# Patient Record
Sex: FEMALE | Race: WHITE | NOT HISPANIC OR LATINO | Employment: FULL TIME | URBAN - METROPOLITAN AREA
[De-identification: names, ages, dates, MRNs, and addresses within clinical notes are randomized per-mention and may not be internally consistent; named-entity substitution may affect disease eponyms.]

---

## 2018-05-06 ENCOUNTER — OFFICE VISIT (OUTPATIENT)
Dept: URGENT CARE | Facility: CLINIC | Age: 43
End: 2018-05-06
Payer: COMMERCIAL

## 2018-05-06 VITALS
DIASTOLIC BLOOD PRESSURE: 67 MMHG | RESPIRATION RATE: 16 BRPM | OXYGEN SATURATION: 100 % | TEMPERATURE: 96.2 F | HEART RATE: 84 BPM | SYSTOLIC BLOOD PRESSURE: 103 MMHG

## 2018-05-06 DIAGNOSIS — J02.9 ACUTE PHARYNGITIS, UNSPECIFIED ETIOLOGY: Primary | ICD-10-CM

## 2018-05-06 PROCEDURE — 99203 OFFICE O/P NEW LOW 30 MIN: CPT | Performed by: NURSE PRACTITIONER

## 2018-05-06 RX ORDER — AMOXICILLIN 500 MG/1
500 CAPSULE ORAL EVERY 8 HOURS SCHEDULED
Qty: 30 CAPSULE | Refills: 0 | Status: SHIPPED | OUTPATIENT
Start: 2018-05-06 | End: 2018-05-16

## 2018-05-06 NOTE — PROGRESS NOTES
[facility]        NAME: Donna Hyde is a 36 y o  female  : 1975    MRN: [id number]  DATE: May 6, 2018  TIME: 8:46 AM    Assessment and Plan   Acute pharyngitis, unspecified etiology [J02 9]  1  Acute pharyngitis, unspecified etiology  amoxicillin (AMOXIL) 500 mg capsule         Patient Instructions       Follow up with PCP in 3-5 days  Proceed to  ER if symptoms worsen  Chief Complaint     Chief Complaint   Patient presents with    Fever     x 4 days    Cold Like Symptoms    Cough    Sore Throat         History of Present Illness       Fever   Episode onset: 4 dasy  The problem has been unchanged  Associated symptoms include congestion, coughing, a fever, headaches, a sore throat and swollen glands  She has tried NSAIDs and acetaminophen for the symptoms  Cough   Associated symptoms include a fever, headaches and a sore throat  Sore Throat    Associated symptoms include congestion, coughing, headaches and swollen glands  Review of Systems   Review of Systems   Constitutional: Positive for fever  HENT: Positive for congestion and sore throat  Eyes: Negative  Respiratory: Positive for cough  Cardiovascular: Negative  Gastrointestinal: Negative  Endocrine: Negative  Genitourinary: Negative  Musculoskeletal: Negative  Allergic/Immunologic: Negative  Neurological: Positive for headaches  Psychiatric/Behavioral: Negative            Current Medications       Current Outpatient Prescriptions:     amoxicillin (AMOXIL) 500 mg capsule, Take 1 capsule (500 mg total) by mouth every 8 (eight) hours for 10 days, Disp: 30 capsule, Rfl: 0    Current Allergies     Allergies as of 2018    (No Known Allergies)            The following portions of the patient's history were reviewed and updated as appropriate: allergies, current medications, past family history, past medical history, past social history, past surgical history and problem list      Past Medical History:   Diagnosis Date    Allergic        History reviewed  No pertinent surgical history  History reviewed  No pertinent family history  Medications have been verified  Objective   /67   Pulse 84   Temp (!) 96 2 °F (35 7 °C)   Resp 16   SpO2 100%        Physical Exam     Physical Exam   Constitutional: She is oriented to person, place, and time  She appears well-developed and well-nourished  HENT:   Head: Normocephalic and atraumatic  Right Ear: External ear normal    Left Ear: External ear normal    Mouth/Throat: Oropharyngeal exudate, posterior oropharyngeal edema and posterior oropharyngeal erythema present  Eyes: Conjunctivae and EOM are normal  Pupils are equal, round, and reactive to light  Neck: Normal range of motion  Neck supple  No thyromegaly present  Cardiovascular: Normal rate, regular rhythm and normal heart sounds  Pulmonary/Chest: Effort normal and breath sounds normal  No respiratory distress  She has no wheezes  She has no rales  Abdominal: Soft  Bowel sounds are normal    Lymphadenopathy:     She has cervical adenopathy  Neurological: She is alert and oriented to person, place, and time  She has normal reflexes  Skin: Skin is warm and dry  Psychiatric: She has a normal mood and affect  Nursing note and vitals reviewed  She was advised to use any of the following for symptomatic control of sore throat:  Saltwater gargles, tea with honey, lozenges, Chloraseptic spray

## 2018-05-06 NOTE — PATIENT INSTRUCTIONS

## 2018-05-07 LAB
INFLUENZA A (VIRAL ID) (HISTORICAL): NEGATIVE
INFLUENZA B (VIRAL ID) (HISTORICAL): POSITIVE
PREGNANCY TEST URINE (HISTORICAL): NEGATIVE
SP GR UR STRIP.AUTO: 1.01 (ref 1–1.03)

## 2019-03-19 ENCOUNTER — TRANSCRIBE ORDERS (OUTPATIENT)
Dept: ADMINISTRATIVE | Facility: HOSPITAL | Age: 44
End: 2019-03-19

## 2019-03-19 DIAGNOSIS — R80.9 PROTEINURIA, UNSPECIFIED TYPE: Primary | ICD-10-CM

## 2019-03-23 ENCOUNTER — HOSPITAL ENCOUNTER (OUTPATIENT)
Dept: ULTRASOUND IMAGING | Facility: HOSPITAL | Age: 44
Discharge: HOME | End: 2019-03-23
Attending: INTERNAL MEDICINE
Payer: COMMERCIAL

## 2019-03-23 DIAGNOSIS — R80.9 PROTEINURIA, UNSPECIFIED TYPE: ICD-10-CM

## 2019-03-23 PROCEDURE — 76770 US EXAM ABDO BACK WALL COMP: CPT

## 2019-08-02 ENCOUNTER — OFFICE VISIT (OUTPATIENT)
Dept: FAMILY MEDICINE CLINIC | Facility: CLINIC | Age: 44
End: 2019-08-02
Payer: COMMERCIAL

## 2019-08-02 VITALS
HEIGHT: 63 IN | SYSTOLIC BLOOD PRESSURE: 108 MMHG | OXYGEN SATURATION: 98 % | TEMPERATURE: 100.6 F | HEART RATE: 89 BPM | DIASTOLIC BLOOD PRESSURE: 68 MMHG | BODY MASS INDEX: 25.64 KG/M2 | WEIGHT: 144.7 LBS

## 2019-08-02 DIAGNOSIS — R10.9 ABDOMINAL PAIN, UNSPECIFIED ABDOMINAL LOCATION: Primary | ICD-10-CM

## 2019-08-02 DIAGNOSIS — R10.2 PELVIC PAIN: ICD-10-CM

## 2019-08-02 PROCEDURE — 99203 OFFICE O/P NEW LOW 30 MIN: CPT | Performed by: FAMILY MEDICINE

## 2019-08-02 PROCEDURE — 3008F BODY MASS INDEX DOCD: CPT | Performed by: FAMILY MEDICINE

## 2019-08-02 PROCEDURE — 1036F TOBACCO NON-USER: CPT | Performed by: FAMILY MEDICINE

## 2019-08-02 RX ORDER — IBUPROFEN 200 MG
1 TABLET ORAL EVERY 6 HOURS PRN
COMMUNITY
End: 2020-10-06 | Stop reason: ALTCHOICE

## 2019-08-02 NOTE — PATIENT INSTRUCTIONS
Pelvic Pain in Women   AMBULATORY CARE:   Pelvic pain  may happen on one or both sides of your pelvis  Pelvic pain may occur with certain body positions or activities, such as when you have sex or a bowel movement  It may worsen during your monthly period or after you sit or stand for a long time  Chronic pelvic pain is pain that continues for longer than 6 months  Call 911 for any of the following:   · You have severe chest pain and sudden trouble breathing  Seek care immediately if:   · You have heavy or unusual vaginal bleeding, and you feel lightheaded or faint  Contact your healthcare provider if:   · You have pelvic pain that does not go away after you take pain medicine  · You develop new symptoms or your symptoms are worse than before  · You have questions or concerns about your condition or care  Medicines: You may need any of the following:  · Pain medicine  may be given in pills or creams to relieve your pain  · Hormones  may be given if your pain gets worse with your menstrual cycle  · Antibiotics  may be given if your pain is caused by infection  · Take your medicine as directed  Contact your healthcare provider if you think your medicine is not helping or if you have side effects  Tell him or her if you are allergic to any medicine  Keep a list of the medicines, vitamins, and herbs you take  Include the amounts, and when and why you take them  Bring the list or the pill bottles to follow-up visits  Carry your medicine list with you in case of an emergency  Self-care:   · Keep a pain diary  Write down when your pain happens, how severe it is, and any other symptoms you have with your pain  A diary will help you keep track of pain cycles  It may also help your healthcare provider find out what is causing your pain  · Learn ways to relax  Deep breathing, meditation, and relaxation techniques can help decrease your pain   When you are tense, your pain may increase  · Change the foods you eat if you have irritable bowel syndrome  Ask your healthcare provider about the best foods for you  Follow up with your healthcare provider as directed:  Write down your questions so you remember to ask them during your visits  © 2017 [employer] Information is for End User's use only and may not be sold, redistributed or otherwise used for commercial purposes  All illustrations and images included in CareNotes® are the copyrighted property of [employer]  or [employer]  The above information is an  only  It is not intended as medical advice for individual conditions or treatments  Talk to your doctor, nurse or pharmacist before following any medical regimen to see if it is safe and effective for you

## 2019-08-02 NOTE — PROGRESS NOTES
Assessment/Plan:    Pelvic pain  Patient has chronic lower abdominal and pelvic pain  I explained to the patient that the #1 Cause of this is endometriosis  #2  Cause is adhesions  Endometriosis was not noted by her surgeon  Symptoms began prior to her surgery  There is no reason she should have adhesions  I think gastrointestinal etiology needs to be ruled out  First, I have recommended screening for celiac disease  I explained to the patient that a simple blood test can rule this out  If celiac disease is ruled out, I would consider evaluation by Gastroenterology to rule out bacterial overgrowth, also consider colonoscopy to rule out a colon polyp  If that workup is unremarkable, I would also consider a urine for porphyrins  Patient tells me she is having some financial difficulty at this point, with her hospital bills  However, she will consent to evaluation with celiac screening at this time  I should have these results next week and I will call her with the results  Diagnoses and all orders for this visit:    Abdominal pain, unspecified abdominal location  -     Endomysial antibody, IgA; Future  -     Tissue transglutaminase, IgA; Future    Pelvic pain    Other orders  -     ibuprofen (MOTRIN) 200 mg tablet; Take 1 tablet by mouth every 6 (six) hours as needed          Subjective:      Patient ID: Karl Aguero is a 39 y o  female  This patient is a 79-year-old white female who presents to the office today to become established at this practice  She tells me that she was never able to get an appointment her current family doctor's office so she wanted to establish a relationship with a different doctor  Patient states that she has chronic pelvic pain  It began when she had an IUD inserted  She tells me it was the 3rd IUD she has had  She had pain immediately  She feels it was not placed properly  She demanded that the IUD be removed  It was but her symptoms persisted    She reports that there is a rare type of endometrial cancer in her family  As such, her gynecologist performed a hysterectomy  This was done in April  Patient was out of work for 6 weeks  She continues to complain of pelvic pain  She reports she was evaluated but nothing was found  The following portions of the patient's history were reviewed and updated as appropriate: allergies, current medications, past family history, past medical history, past social history, past surgical history and problem list     Review of Systems   Cardiovascular: Negative for chest pain, palpitations and leg swelling  Gastrointestinal: Positive for abdominal distention, abdominal pain and constipation  Negative for anal bleeding, blood in stool, diarrhea, nausea, rectal pain and vomiting  Genitourinary: Positive for pelvic pain  Negative for difficulty urinating, dysuria, frequency, hematuria and urgency  Objective:      /68 (BP Location: Left arm, Patient Position: Sitting, Cuff Size: Adult)   Pulse 89   Temp (!) 100 6 °F (38 1 °C) (Tympanic)   Ht 5' 3" (1 6 m)   Wt 65 6 kg (144 lb 11 2 oz)   SpO2 98%   BMI 25 63 kg/m²          Physical Exam   Constitutional: She appears well-developed and well-nourished  No distress  HENT:   Head: Normocephalic and atraumatic  Right Ear: External ear normal    Left Ear: External ear normal    Mouth/Throat: Oropharynx is clear and moist  No oropharyngeal exudate  Tympanic membranes are clear   Eyes: Pupils are equal, round, and reactive to light  Conjunctivae are normal  No scleral icterus  Neck: Neck supple  No tracheal deviation present  No thyromegaly present  Cardiovascular: Normal rate, regular rhythm and normal heart sounds  Exam reveals no gallop and no friction rub  No murmur heard  Pulmonary/Chest: Effort normal and breath sounds normal  No stridor  No respiratory distress  She has no wheezes  She has no rales  Abdominal: Soft   Bowel sounds are normal  She exhibits no distension and no mass  There is no tenderness  There is no rebound and no guarding  No organomegaly is noted  No tympany to percussion  Lymphadenopathy:     She has no cervical adenopathy  Psychiatric: She has a normal mood and affect  Her behavior is normal  Judgment and thought content normal    Vitals reviewed  extremities:  Without cyanosis, clubbing, or edema

## 2019-08-02 NOTE — ASSESSMENT & PLAN NOTE
Patient has chronic lower abdominal and pelvic pain  I explained to the patient that the #1 Cause of this is endometriosis  #2  Cause is adhesions  Endometriosis was not noted by her surgeon  Symptoms began prior to her surgery  There is no reason she should have adhesions  I think gastrointestinal etiology needs to be ruled out  First, I have recommended screening for celiac disease  I explained to the patient that a simple blood test can rule this out  If celiac disease is ruled out, I would consider evaluation by Gastroenterology to rule out bacterial overgrowth, also consider colonoscopy to rule out a colon polyp  If that workup is unremarkable, I would also consider a urine for porphyrins  Patient tells me she is having some financial difficulty at this point, with her hospital bills  However, she will consent to evaluation with celiac screening at this time  I should have these results next week and I will call her with the results

## 2020-10-06 ENCOUNTER — OFFICE VISIT (OUTPATIENT)
Dept: FAMILY MEDICINE CLINIC | Facility: CLINIC | Age: 45
End: 2020-10-06
Payer: COMMERCIAL

## 2020-10-06 VITALS
HEART RATE: 66 BPM | BODY MASS INDEX: 25.33 KG/M2 | TEMPERATURE: 98.5 F | DIASTOLIC BLOOD PRESSURE: 70 MMHG | SYSTOLIC BLOOD PRESSURE: 102 MMHG | OXYGEN SATURATION: 100 % | WEIGHT: 148.4 LBS | HEIGHT: 64 IN

## 2020-10-06 DIAGNOSIS — Z00.00 ANNUAL PHYSICAL EXAM: Primary | ICD-10-CM

## 2020-10-06 DIAGNOSIS — N60.02 CYST OF LEFT BREAST: ICD-10-CM

## 2020-10-06 DIAGNOSIS — Z13.220 ENCOUNTER FOR LIPID SCREENING FOR CARDIOVASCULAR DISEASE: ICD-10-CM

## 2020-10-06 DIAGNOSIS — Z12.31 ENCOUNTER FOR SCREENING MAMMOGRAM FOR MALIGNANT NEOPLASM OF BREAST: ICD-10-CM

## 2020-10-06 DIAGNOSIS — Z13.6 ENCOUNTER FOR LIPID SCREENING FOR CARDIOVASCULAR DISEASE: ICD-10-CM

## 2020-10-06 PROBLEM — Z80.3 FAMILY HISTORY OF BREAST CANCER: Status: ACTIVE | Noted: 2020-10-06

## 2020-10-06 PROBLEM — Z80.49 FAMILY HISTORY OF UTERINE CANCER: Status: ACTIVE | Noted: 2020-10-06

## 2020-10-06 PROBLEM — J30.2 SEASONAL ALLERGIES: Status: ACTIVE | Noted: 2020-10-06

## 2020-10-06 PROCEDURE — 3725F SCREEN DEPRESSION PERFORMED: CPT | Performed by: FAMILY MEDICINE

## 2020-10-06 PROCEDURE — 1036F TOBACCO NON-USER: CPT | Performed by: FAMILY MEDICINE

## 2020-10-06 PROCEDURE — 99386 PREV VISIT NEW AGE 40-64: CPT | Performed by: FAMILY MEDICINE

## 2021-01-25 ENCOUNTER — TELEPHONE (OUTPATIENT)
Dept: FAMILY MEDICINE CLINIC | Facility: CLINIC | Age: 46
End: 2021-01-25

## 2021-01-25 NOTE — TELEPHONE ENCOUNTER
Please ask her to clarify- is this the letter from her work physical visit in October? And has she had any recent illnesses or changes in her health since last visit- thanks!

## 2021-01-25 NOTE — TELEPHONE ENCOUNTER
Pt called and stated that her letter to go back to work needs to be adjusted    It has to say that she is free of any communicable diseases and have no work restrictions  On the letter      Please advise    Phone: [phone]

## 2021-01-28 NOTE — TELEPHONE ENCOUNTER
Patient called back  She states that the note is for her to provide potential employers as she is an LPN looking for a new job  It just needs to state she has no communicable diseases and that she is able to work  She states there were no changes in her health or any illnesses since her last visit with you in October

## 2021-03-25 ENCOUNTER — APPOINTMENT (EMERGENCY)
Dept: RADIOLOGY | Facility: HOSPITAL | Age: 46
End: 2021-03-25
Payer: COMMERCIAL

## 2021-03-25 ENCOUNTER — HOSPITAL ENCOUNTER (EMERGENCY)
Facility: HOSPITAL | Age: 46
Discharge: HOME | End: 2021-03-25
Attending: EMERGENCY MEDICINE | Admitting: EMERGENCY MEDICINE
Payer: COMMERCIAL

## 2021-03-25 VITALS
OXYGEN SATURATION: 100 % | SYSTOLIC BLOOD PRESSURE: 129 MMHG | BODY MASS INDEX: 26.13 KG/M2 | RESPIRATION RATE: 16 BRPM | WEIGHT: 142 LBS | HEART RATE: 77 BPM | HEIGHT: 62 IN | DIASTOLIC BLOOD PRESSURE: 82 MMHG | TEMPERATURE: 97 F

## 2021-03-25 DIAGNOSIS — M25.522 LEFT ELBOW PAIN: Primary | ICD-10-CM

## 2021-03-25 PROCEDURE — 99283 EMERGENCY DEPT VISIT LOW MDM: CPT

## 2021-03-25 PROCEDURE — 99282 EMERGENCY DEPT VISIT SF MDM: CPT | Performed by: EMERGENCY MEDICINE

## 2021-03-25 PROCEDURE — 73080 X-RAY EXAM OF ELBOW: CPT

## 2021-03-25 NOTE — DISCHARGE INSTRUCTIONS
RETURN IF WORSE IN ANY WAY, OR NEW AND CONCERNING SYMPTOMS SIGNS OR SYMPTOMS:  INCREASED PAIN, INCREASED SWELLING AT THE SITE OF INJURY    APPLY ICE AS NEEDED  YOU CAN TAKE TYLENOL AND MOTRIN, AS NEEDED, AND AS DIRECTED     PLEASE CALL YOUR PRIMARY DOCTOR IN THE MORNING TO SET UP FOLLOW UP

## 2021-03-25 NOTE — ED PROVIDER NOTES
History  Chief Complaint   Patient presents with    Elbow Injury     LEFT elbow, ongoing, worse the past week, pain worse with movememnt or carrying things, neuro intact       53 yo Female    PMH:    None    Chief Complaint:   Left elbow pain has been going on for several weeks      Mode of Transport:   Private car      Mechanism:  Pt was carrying heavy wash baskets, approx 50 lbs  Has been hurting since      No other injuries    No fever/chills  No n/v  No headache/dizziness    No redness of the elbow, no swelling    Interventions:  Motrin, did not help much       No other complaints            History provided by:  Patient  Elbow Pain  Location:  Elbow  Elbow location:  L elbow  Pain details:     Quality:  Aching    Onset quality:  Gradual    Timing:  Constant  Handedness:  Right-handed  Dislocation: no    Foreign body present:  No foreign bodies  Relieved by:  Rest  Worsened by:  Bearing weight, movement and stress  Associated symptoms: no back pain, no decreased range of motion, no fatigue, no fever, no muscle weakness, no neck pain, no numbness, no stiffness, no swelling and no tingling        None       Past Medical History:   Diagnosis Date    Allergic     seasonal       Past Surgical History:   Procedure Laterality Date     SECTION      HYSTERECTOMY         Family History   Problem Relation Age of Onset    Uterine cancer Mother     Leukemia Father     Fibroids Sister     No Known Problems Daughter     No Known Problems Sister     Uterine cancer Maternal Aunt      I have reviewed and agree with the history as documented      E-Cigarette/Vaping     E-Cigarette/Vaping Substances     Social History     Tobacco Use    Smoking status: Former Smoker     Quit date:      Years since quittin 2    Smokeless tobacco: Never Used   Substance Use Topics    Alcohol use: Yes     Frequency: Monthly or less     Drinks per session: 1 or 2     Binge frequency: Never    Drug use: Never       Review of Systems   Constitutional: Negative for chills, diaphoresis, fatigue and fever  Respiratory: Negative for cough, shortness of breath, wheezing and stridor  Cardiovascular: Negative for chest pain, palpitations and leg swelling  Gastrointestinal: Negative for abdominal pain, blood in stool, nausea and vomiting  Genitourinary: Negative for difficulty urinating, dysuria, flank pain and frequency  Musculoskeletal: Negative for arthralgias, back pain, gait problem, joint swelling, myalgias, neck pain, neck stiffness and stiffness  Left posterior elbow pain    Skin: Negative for rash and wound  Neurological: Negative for dizziness, light-headedness and headaches  All other systems reviewed and are negative  Physical Exam  Physical Exam  Constitutional:       General: She is not in acute distress  Appearance: She is well-developed  She is not ill-appearing, toxic-appearing or diaphoretic  HENT:      Head: Normocephalic and atraumatic  Nose: Nose normal       Mouth/Throat:      Pharynx: No oropharyngeal exudate  Eyes:      General: No scleral icterus  Right eye: No discharge  Left eye: No discharge  Conjunctiva/sclera: Conjunctivae normal       Pupils: Pupils are equal, round, and reactive to light  Neck:      Musculoskeletal: Normal range of motion and neck supple  No neck rigidity or muscular tenderness  Vascular: No JVD  Trachea: No tracheal deviation  Cardiovascular:      Rate and Rhythm: Normal rate and regular rhythm  Heart sounds: Normal heart sounds  No murmur  No friction rub  No gallop  Pulmonary:      Effort: Pulmonary effort is normal  No respiratory distress  Breath sounds: Normal breath sounds  No stridor  No wheezing, rhonchi or rales  Chest:      Chest wall: No tenderness  Abdominal:      General: Bowel sounds are normal  There is no distension  Palpations: Abdomen is soft  There is no mass        Tenderness: There is no abdominal tenderness  There is no right CVA tenderness, left CVA tenderness, guarding or rebound  Hernia: No hernia is present  Musculoskeletal: Normal range of motion  General: Tenderness (VERY MILD TENDERNESS OVER THE PROXIMAL OLECRANON WITHOUT CREPITUS OR ECCHYMOSIS  NO STEP-OFFS  NO SWELLING  NO REDNESS) present  No swelling, deformity or signs of injury  Right lower leg: No edema  Left lower leg: No edema  Lymphadenopathy:      Cervical: No cervical adenopathy  Skin:     General: Skin is warm  Capillary Refill: Capillary refill takes less than 2 seconds  Coloration: Skin is not jaundiced or pale  Findings: No bruising, erythema, lesion or rash  Neurological:      General: No focal deficit present  Mental Status: She is alert and oriented to person, place, and time  Mental status is at baseline  Cranial Nerves: No cranial nerve deficit  Sensory: No sensory deficit  Motor: No weakness or abnormal muscle tone  Coordination: Coordination normal       Gait: Gait normal    Psychiatric:         Mood and Affect: Mood normal          Behavior: Behavior normal          Thought Content:  Thought content normal          Judgment: Judgment normal          Vital Signs  ED Triage Vitals [03/25/21 0133]   Temperature Pulse Respirations Blood Pressure SpO2   (!) 97 °F (36 1 °C) 77 16 129/82 100 %      Temp Source Heart Rate Source Patient Position - Orthostatic VS BP Location FiO2 (%)   Tympanic -- Sitting Right arm --      Pain Score       4           Vitals:    03/25/21 0133   BP: 129/82   Pulse: 77   Patient Position - Orthostatic VS: Sitting         Visual Acuity      ED Medications  Medications - No data to display    Diagnostic Studies  Results Reviewed     None                 XR elbow 3+ vw LEFT   ED Interpretation by Xuan Fraga MD (03/25 0221)     INDICATION: Julianne Dopp           VIEWS:  XR elbow 3+ VW Left       FINDINGS:     There is no acute fracture or dislocation      No significant degenerative changes      No lytic or blastic osseous lesion      Soft tissues are unremarkable      IMPRESSION:     No acute osseous abnormality  Procedures  Procedures         ED Course  ED Course as of Mar 25 0344   Thu Mar 25, 2021   0221 Xray unremarkable  Will dc w/ Ortho f/u       0227 PATIENT IS HAPPY WITH PLAN TO DISCHARGE  SHE DOES NOT WANT SEE DR Chelsy Escamilla  MDM    Disposition  Final diagnoses:   Left elbow pain     Time reflects when diagnosis was documented in both MDM as applicable and the Disposition within this note     Time User Action Codes Description Comment    3/25/2021  2:23 AM Terry Hdez Add [L51 206] Left elbow pain       ED Disposition     ED Disposition Condition Date/Time Comment    Discharge Stable u Mar 25, 2021  2:23 AM Halley Candelario discharge to home/self care  Follow-up Information     Follow up With Specialties Details Why Contact Info    Ricarda Narvaez MD Orthopedic Surgery Call today  Penny Ly [facility] Orthopedic Surgery Call today  [address] [place] [address]  [phone]            There are no discharge medications for this patient          PDMP Review     None          ED Provider  Electronically Signed by           Elizabeth Kay MD  03/25/21 8902

## 2021-03-31 ENCOUNTER — OFFICE VISIT (OUTPATIENT)
Dept: FAMILY MEDICINE CLINIC | Facility: CLINIC | Age: 46
End: 2021-03-31
Payer: COMMERCIAL

## 2021-03-31 VITALS
BODY MASS INDEX: 27.6 KG/M2 | HEART RATE: 68 BPM | HEIGHT: 62 IN | WEIGHT: 150 LBS | OXYGEN SATURATION: 98 % | RESPIRATION RATE: 14 BRPM | TEMPERATURE: 98.1 F | DIASTOLIC BLOOD PRESSURE: 60 MMHG | SYSTOLIC BLOOD PRESSURE: 110 MMHG

## 2021-03-31 DIAGNOSIS — M47.22 OSTEOARTHRITIS OF SPINE WITH RADICULOPATHY, CERVICAL REGION: Primary | ICD-10-CM

## 2021-03-31 DIAGNOSIS — E55.9 VITAMIN D DEFICIENCY: ICD-10-CM

## 2021-03-31 DIAGNOSIS — G89.29 CHRONIC LEFT SHOULDER PAIN: ICD-10-CM

## 2021-03-31 DIAGNOSIS — M25.512 CHRONIC LEFT SHOULDER PAIN: ICD-10-CM

## 2021-03-31 PROCEDURE — 3725F SCREEN DEPRESSION PERFORMED: CPT | Performed by: INTERNAL MEDICINE

## 2021-03-31 PROCEDURE — 99214 OFFICE O/P EST MOD 30 MIN: CPT | Performed by: INTERNAL MEDICINE

## 2021-03-31 PROCEDURE — 3008F BODY MASS INDEX DOCD: CPT | Performed by: INTERNAL MEDICINE

## 2021-03-31 PROCEDURE — 1036F TOBACCO NON-USER: CPT | Performed by: INTERNAL MEDICINE

## 2021-03-31 RX ORDER — MELOXICAM 15 MG/1
15 TABLET ORAL DAILY
COMMUNITY
Start: 2021-03-26 | End: 2021-06-30

## 2021-03-31 NOTE — PROGRESS NOTES
[address] [facility]        NAME: Gracelyn Schlatter is a 54 y o  female  : 1975    MRN: [id number]  DATE: 2021  TIME: 9:30 AM    Assessment and Plan   1  Osteoarthritis of spine with radiculopathy, cervical region  - xrays showing degenerative changes C6-7, strength and reflexes intact, we discussed PT and NSAIDs, follow up in 2-3 months   -    CBC and differential; Future  -     Comprehensive metabolic panel; Future  -     Vitamin D 25 hydroxy; Future    2  Vitamin D deficiency  -     Vitamin D 25 hydroxy; Future    3  Chronic left shoulder pain  - suspect referred pain from neck to shoulder and elbow           Chief Complaint     Chief Complaint   Patient presents with    Shoulder Pain      went to another doc, states that the issue is in her neck and back         History of Present Illness       Here for follow up left shoulder and elbow pain  Started about 2 months ago after lifting heavy wash baskets in snow storm  Located in left elbow and shoulder, worse with activity like raising arm above head and bending elbow  Went to ED and had xray of elbow which was normal  Saw [facility] last week, diagnosed with cervical radiculopathy, referred to PT  Has EMG scheduled in  and given Mobic  Denies neck pain or limited ROM but does report "stiffness" in her neck  Denies numbness, paresthesias or weakness  Remote history of low back injury while in grade school  No history of spinal surgery  Review of Systems   Review of Systems   Constitutional: Positive for fatigue  Negative for appetite change, chills and fever  Musculoskeletal: Positive for back pain and neck stiffness  Negative for arthralgias, gait problem, joint swelling and neck pain  Neurological: Negative for tremors, weakness and numbness           Current Medications       Current Outpatient Medications:     meloxicam (MOBIC) 15 mg tablet, Take 15 mg by mouth daily, Disp: , Rfl:     Current Allergies     Allergies as of 2021    (No Known Allergies)            The following portions of the patient's history were reviewed and updated as appropriate: allergies, current medications, past family history, past medical history, past social history, past surgical history and problem list      Past Medical History:   Diagnosis Date    Allergic     seasonal       Past Surgical History:   Procedure Laterality Date     SECTION      HYSTERECTOMY         Family History   Problem Relation Age of Onset    Uterine cancer Mother     Leukemia Father     Fibroids Sister     No Known Problems Daughter     No Known Problems Sister     Uterine cancer Maternal Aunt          Medications have been verified  Objective   /60   Pulse 68   Temp 98 1 °F (36 7 °C) (Tympanic)   Resp 14   Ht 5' 2" (1 575 m)   Wt 68 kg (150 lb)   SpO2 98%   BMI 27 44 kg/m²        Physical Exam     Physical Exam  Vitals signs reviewed  Constitutional:       General: She is not in acute distress  Appearance: Normal appearance  She is normal weight  Neck:      Musculoskeletal: Normal range of motion  Normal range of motion  Pain with movement (some pain with lateral bending of neck) present  No spinous process tenderness or muscular tenderness  Musculoskeletal:      Left shoulder: She exhibits normal range of motion, no bony tenderness, no swelling and no crepitus  Left elbow: She exhibits normal range of motion, no swelling and no effusion  Tenderness found  Medial epicondyle tenderness noted  No olecranon process tenderness noted  Neurological:      Mental Status: She is alert  Motor: Motor function is intact  Gait: Gait is intact  Deep Tendon Reflexes:      Reflex Scores:       Tricep reflexes are 2+ on the right side and 2+ on the left side  Bicep reflexes are 2+ on the right side and 2+ on the left side  Brachioradialis reflexes are 2+ on the right side and 2+ on the left side  Patellar reflexes are 2+ on the right side and 2+ on the left side  Psychiatric:         Mood and Affect: Mood and affect normal          Speech: Speech normal          Behavior: Behavior normal  Behavior is cooperative  Thought Content: Thought content normal              Results:  C-spine xray 03/26/21  Exam: Cervical spine x-rays     Clinical indication: Radiculopathy    Comparison: None    Technique: 3 views of the cervical spine was obtained  Findings: AP and lateral views of the cervical spine demonstrates no evidence of fracture or dislocation  The prevertebral soft tissue space is within normal limits  Anterior spur formation is seen at the level of C5-C6  Spondylosis deformans characterized by anterior and posterior spur formation, disc space narrowing and osteoarthritic changes are seen at the level of C6-C7  The lateral masses of C1 articulate normally with the odontoid process  IMPRESSION:  Impression: Spondylosis deformans and osteoarthritic changes with disc space narrowing as described above

## 2021-03-31 NOTE — PATIENT INSTRUCTIONS
Please get blood work done to check calcium and vitamin D  Schedule with physical therapy, continue Mobic

## 2021-04-02 ENCOUNTER — EVALUATION (OUTPATIENT)
Dept: PHYSICAL THERAPY | Facility: CLINIC | Age: 46
End: 2021-04-02
Payer: COMMERCIAL

## 2021-04-02 ENCOUNTER — TRANSCRIBE ORDERS (OUTPATIENT)
Dept: PHYSICAL THERAPY | Facility: CLINIC | Age: 46
End: 2021-04-02

## 2021-04-02 VITALS — SYSTOLIC BLOOD PRESSURE: 98 MMHG | HEART RATE: 69 BPM | DIASTOLIC BLOOD PRESSURE: 69 MMHG

## 2021-04-02 DIAGNOSIS — M54.2 NECK PAIN: Primary | ICD-10-CM

## 2021-04-02 PROCEDURE — 97110 THERAPEUTIC EXERCISES: CPT | Performed by: PHYSICAL THERAPIST

## 2021-04-02 PROCEDURE — 97161 PT EVAL LOW COMPLEX 20 MIN: CPT | Performed by: PHYSICAL THERAPIST

## 2021-04-02 NOTE — LETTER
2021    Dahlia López, [address]  [address] [facility] [address]    Patient: Seven Nichole   YOB: 1975   Date of Visit: 2021     Encounter Diagnosis     ICD-10-CM    1  Neck pain  M54 2        Dear Dr Dai Rodriguez: Thank you for your recent referral of Seven Nichole  Please review the attached evaluation summary from Jerilyn's recent visit  Please verify that you agree with the plan of care by signing the attached order  If you have any questions or concerns, please do not hesitate to call  I sincerely appreciate the opportunity to share in the care of one of your patients and hope to have another opportunity to work with you in the near future  Sincerely,    Blake Aldana, PT      Referring Provider:      I certify that I have read the below Plan of Care and certify the need for these services furnished under this plan of treatment while under my care  Dahlia López MD  [address] [place] [address]  [address] [place] [phone]  Via Fax: [phone]          PT Evaluation     Today's date: 2021  Patient name: Seven Nichole  : 1975  MRN: [id number]  Referring provider: Carmita Landaverde MD  Dx:   Encounter Diagnosis     ICD-10-CM    1  Neck pain  M54 2                   Assessment  Assessment details: Seven Nichole is a 54 y o  female who presents with complaints of chronic neck pain and stiffness  No further referral appears necessary at this time based upon examination results  PT diagnosis of postural syndrome as patient's symptoms are exacerbated by poor posture and improved with correction of posture  Patient has no radicular symptoms  Etiologic factors include repetitive poor body mechanics  Prognosis is good given HEP compliance and PT 2x/wk  Please contact me if you have any questions or recommendations  Thank you for the opportunity to share in  [facility] care       Impairments: abnormal or restricted ROM, activity intolerance, impaired physical strength, lacks appropriate home exercise program, pain with function and poor posture   Functional limitations: stiffness and pain during work activity and with lifting, pain and stiffness when using computer or looking at phoneUnderstanding of Dx/Px/POC: good   Prognosis: good    Goals  STGs  1) In 2-4 weeks patient will demonstrate improved postural awareness  2) In 2-4 weeks patient will report 50% reduction in symptoms  3) In 2-4 weeks patient will demonstrate improved DNF endurance, able to maintain supine cervical retraction with head lift for 10 seconds or more    LTGs  1) In 4-6 weeks patient will demonstrate independence with HEP for maintenance  2) In 4-6 weeks patient will report 75% improved symptoms or better    Plan  Patient would benefit from: skilled physical therapy  Referral necessary: No  Planned modality interventions: cryotherapy, TENS, thermotherapy: hydrocollator packs, traction and unattended electrical stimulation  Planned therapy interventions: joint mobilization, massage, Brooks taping, manual therapy, postural training, patient education, home exercise program, functional ROM exercises and flexibility  Frequency: 2x week  Duration in weeks: 6  Plan of Care beginning date: 4/2/2021  Plan of Care expiration date: 5/14/2021  Treatment plan discussed with: patient        Subjective Evaluation    History of Present Illness  Mechanism of injury: Patient presents to PT with c/o neck stiffness that has been going on for years  She reports the stiffness has just not gotten any better  She cannot think of any specific incident and denies trauma  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  She did have a recent x-ray which showed arthritis  She reports that her arms also feel a little weak when she tries to exercise  She does not notice pain that radiates into her arms  She denies N/T   Denies difficulty speaking or swallowing  Denies lightheadedness or dizziness  Denies nausea or vomiting  Denies any recent unexplained weight loss  Denies headaches  Denies difficulty sleeping or pain at night  Patient reports she feels best first thing in the morning and is fine when she doesn't have to do much  Stiffness comes on throughout her work day  She was prescribed Mobic and started taking this on Monday  She has not noticed much difference since starting this medication  Pain  At best pain ratin  At worst pain ratin  Location: back of neck, b/l  Quality: stiffness    Relieving factors: heat and rest  Aggravating factors: lifting and overhead activity    Social Support  Lives with: young children    Employment status: working  Hand dominance: right  Exercise history: no routine exercise      Diagnostic Tests  X-ray: normal  Treatments  No previous or current treatments  Patient Goals  Patient goals for therapy: decreased pain  Patient goal: improve comfort, decrease stiffness        Objective     Concurrent Complaints  Negative for night pain, disturbed sleep, dizziness, faints, headaches, nausea/motion sickness, tinnitus, trouble swallowing, difficulty breathing, shortness of breath, respiratory pain and visual change    Postural Observations  Seated posture: fair  Standing posture: fair  Correction of posture: makes symptoms better        Active Range of Motion   Cervical/Thoracic Spine       Cervical  Subcranial protraction:  WFL and with pain   Subcranial retraction:   Restriction level: moderate  Flexion: 35 degrees   Extension: 65 degrees      Left lateral flexion: 45 ("tightness" on R side) degrees     with pain  Right lateral flexion: 45 degrees      Left rotation: 75 degrees  Right rotation: 75 degrees             Joint Play     Hypomobile: T1, T2, T3, T4, T5, T6 and T7     Strength/Myotome Testing   Cervical Spine     Left   Interossei strength (t1): 5    Right   Interossei strength (t1): 5    Left Shoulder     Planes of Motion   Abduction: 5     Isolated Muscles   Upper trapezius: 5     Right Shoulder     Planes of Motion   Abduction: 5     Isolated Muscles   Upper trapezius: 5     Left Elbow   Flexion: 4+  Extension: 4+    Right Elbow   Flexion: 4+  Extension: 4+    Left Wrist/Hand   Thumb extension: 5    Right Wrist/Hand   Thumb extension: 5    Tests   Cervical   Negative vertical compression, lumbar distraction and neck flexor muscle endurance test      Left   Negative Spurling's Test A  Right   Negative Spurling's Test A  Left Shoulder   Positive ULTT4  Negative ULTT1  Right Shoulder   Negative ULTT1 and ULTT4  Lumbar   Negative vertical compression  Neuro Exam:     Headaches   Patient reports headaches: No               Precautions: none  POC exp 5/14/21  Manuals 4/2                                                                Neuro Re-Ed             Seated chin tuck with self OP 3" x10            Seated Postural correction with towel roll instructed for HEP            Scap retractions             B/l TB ER              Supine chin tuck with head lift DNF strengthening             Prone shoulder scaption             Prone horizontal abduction             Prone shoulder extension             TB Pull downs with cues for postural correction             TB rows with cues for postural correction                          Ther Ex             UT stretch 15"x4 ea            Levator scap stretch 15" x4 ea            Thoracic extension over chair                                                                              Ther Activity             UBE for UE endurance                                                                 Modalities                                             Patient was provided a home exercise program and demonstrated an understanding of exercises  Patient was advised to stop performing home exercise program if symptoms increase or new complaints developed    Verbal understanding demonstrated regarding home exercise program instructions

## 2021-04-02 NOTE — PROGRESS NOTES
PT Evaluation     Today's date: 2021  Patient name: Katharine Neil  : 1975  MRN: [id number]  Referring provider: Fawn Hernandez MD  Dx:   Encounter Diagnosis     ICD-10-CM    1  Neck pain  M54 2                   Assessment  Assessment details: Katharine Neil is a 54 y o  female who presents with complaints of chronic neck pain and stiffness  No further referral appears necessary at this time based upon examination results  PT diagnosis of postural syndrome as patient's symptoms are exacerbated by poor posture and improved with correction of posture  Patient has no radicular symptoms  Etiologic factors include repetitive poor body mechanics  Prognosis is good given HEP compliance and PT 2x/wk  Please contact me if you have any questions or recommendations  Thank you for the opportunity to share in  [facility] care       Impairments: abnormal or restricted ROM, activity intolerance, impaired physical strength, lacks appropriate home exercise program, pain with function and poor posture   Functional limitations: stiffness and pain during work activity and with lifting, pain and stiffness when using computer or looking at phoneUnderstanding of Dx/Px/POC: good   Prognosis: good    Goals  STGs  1) In 2-4 weeks patient will demonstrate improved postural awareness  2) In 2-4 weeks patient will report 50% reduction in symptoms  3) In 2-4 weeks patient will demonstrate improved DNF endurance, able to maintain supine cervical retraction with head lift for 10 seconds or more    LTGs  1) In 4-6 weeks patient will demonstrate independence with HEP for maintenance  2) In 4-6 weeks patient will report 75% improved symptoms or better    Plan  Patient would benefit from: skilled physical therapy  Referral necessary: No  Planned modality interventions: cryotherapy, TENS, thermotherapy: hydrocollator packs, traction and unattended electrical stimulation  Planned therapy interventions: joint mobilization, massage, Todd taping, manual therapy, postural training, patient education, home exercise program, functional ROM exercises and flexibility  Frequency: 2x week  Duration in weeks: 6  Plan of Care beginning date: 2021  Plan of Care expiration date: 2021  Treatment plan discussed with: patient        Subjective Evaluation    History of Present Illness  Mechanism of injury: Patient presents to PT with c/o neck stiffness that has been going on for years  She reports the stiffness has just not gotten any better  She cannot think of any specific incident and denies trauma  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  She did have a recent x-ray which showed arthritis  She reports that her arms also feel a little weak when she tries to exercise  She does not notice pain that radiates into her arms  She denies N/T  Denies difficulty speaking or swallowing  Denies lightheadedness or dizziness  Denies nausea or vomiting  Denies any recent unexplained weight loss  Denies headaches  Denies difficulty sleeping or pain at night  Patient reports she feels best first thing in the morning and is fine when she doesn't have to do much  Stiffness comes on throughout her work day  She was prescribed Mobic and started taking this on Monday  She has not noticed much difference since starting this medication  Pain  At best pain ratin  At worst pain ratin  Location: back of neck, b/l  Quality: stiffness    Relieving factors: heat and rest  Aggravating factors: lifting and overhead activity    Social Support  Lives with: young children    Employment status: working  Hand dominance: right  Exercise history: no routine exercise      Diagnostic Tests  X-ray: normal  Treatments  No previous or current treatments  Patient Goals  Patient goals for therapy: decreased pain  Patient goal: improve comfort, decrease stiffness        Objective     Concurrent Complaints  Negative for night pain, disturbed sleep, dizziness, faints, headaches, nausea/motion sickness, tinnitus, trouble swallowing, difficulty breathing, shortness of breath, respiratory pain and visual change    Postural Observations  Seated posture: fair  Standing posture: fair  Correction of posture: makes symptoms better        Active Range of Motion   Cervical/Thoracic Spine       Cervical  Subcranial protraction:  WFL and with pain   Subcranial retraction:   Restriction level: moderate  Flexion: 35 degrees   Extension: 65 degrees      Left lateral flexion: 45 ("tightness" on R side) degrees     with pain  Right lateral flexion: 45 degrees      Left rotation: 75 degrees  Right rotation: 75 degrees             Joint Play     Hypomobile: T1, T2, T3, T4, T5, T6 and T7     Strength/Myotome Testing   Cervical Spine     Left   Interossei strength (t1): 5    Right   Interossei strength (t1): 5    Left Shoulder     Planes of Motion   Abduction: 5     Isolated Muscles   Upper trapezius: 5     Right Shoulder     Planes of Motion   Abduction: 5     Isolated Muscles   Upper trapezius: 5     Left Elbow   Flexion: 4+  Extension: 4+    Right Elbow   Flexion: 4+  Extension: 4+    Left Wrist/Hand   Thumb extension: 5    Right Wrist/Hand   Thumb extension: 5    Tests   Cervical   Negative vertical compression, lumbar distraction and neck flexor muscle endurance test      Left   Negative Spurling's Test A  Right   Negative Spurling's Test A  Left Shoulder   Positive ULTT4  Negative ULTT1  Right Shoulder   Negative ULTT1 and ULTT4  Lumbar   Negative vertical compression     Neuro Exam:     Headaches   Patient reports headaches: No               Precautions: none  POC exp 5/14/21  Manuals 4/2                                                                Neuro Re-Ed             Seated chin tuck with self OP 3" x10            Seated Postural correction with towel roll instructed for HEP            Scap retractions             B/l TB ER Supine chin tuck with head lift DNF strengthening             Prone shoulder scaption             Prone horizontal abduction             Prone shoulder extension             TB Pull downs with cues for postural correction             TB rows with cues for postural correction                          Ther Ex             UT stretch 15"x4 ea            Levator scap stretch 15" x4 ea            Thoracic extension over chair                                                                              Ther Activity             UBE for UE endurance                                                                 Modalities                                             Patient was provided a home exercise program and demonstrated an understanding of exercises  Patient was advised to stop performing home exercise program if symptoms increase or new complaints developed  Verbal understanding demonstrated regarding home exercise program instructions

## 2021-04-12 ENCOUNTER — APPOINTMENT (OUTPATIENT)
Dept: PHYSICAL THERAPY | Facility: CLINIC | Age: 46
End: 2021-04-12
Payer: COMMERCIAL

## 2021-04-16 ENCOUNTER — APPOINTMENT (OUTPATIENT)
Dept: PHYSICAL THERAPY | Facility: CLINIC | Age: 46
End: 2021-04-16
Payer: COMMERCIAL

## 2021-04-19 ENCOUNTER — OFFICE VISIT (OUTPATIENT)
Dept: PHYSICAL THERAPY | Facility: CLINIC | Age: 46
End: 2021-04-19
Payer: COMMERCIAL

## 2021-04-19 DIAGNOSIS — M54.2 NECK PAIN: Primary | ICD-10-CM

## 2021-04-19 PROCEDURE — 97110 THERAPEUTIC EXERCISES: CPT | Performed by: PHYSICAL THERAPIST

## 2021-04-19 PROCEDURE — 97530 THERAPEUTIC ACTIVITIES: CPT | Performed by: PHYSICAL THERAPIST

## 2021-04-19 PROCEDURE — 97112 NEUROMUSCULAR REEDUCATION: CPT | Performed by: PHYSICAL THERAPIST

## 2021-04-19 NOTE — PROGRESS NOTES
Daily Note     Today's date: 2021  Patient name: Cristine Luna  : 1975  MRN: [id number]  Referring provider: Vasyl Astudillo MD  Dx:   Encounter Diagnosis     ICD-10-CM    1  Neck pain  M54 2                   Subjective: The patient reports that she is not having any pain or stiffness today  She notes she has been doing her exercises at home, has been unable to attend PT since her IE secondary to work schedule  Objective: See treatment diary below  Assessment: Initiated TE as outlined below in daily treatment diary  She did note some clicking in her L shoulder with TBand pull downs and prone TE though no increase in pain during session  She had no pain to end  Continued PT would be beneficial to improve function  Plan: Continue per plan of care  Progress as able in upcoming visits         Precautions: none  POC exp 21  Manuals                                                                Neuro Re-Ed             Seated chin tuck with self OP 3" x10 3" x 10           Seated Postural correction with towel roll instructed for HEP            Scap retractions             B/l TB ER   Green  2 x 10            Supine chin tuck with head lift DNF strengthening             Prone shoulder scaption  Severo  2 x 10           Prone horizontal abduction  Severo   2 x 10           Prone shoulder extension  Severo   2 x 10            TB Pull downs with cues for postural correction  Blue  3 x 10           TB rows with cues for postural correction  Blue   3 x 10                         Ther Ex             UT stretch 15"x4 ea 15" x 4 Severo           Levator scap stretch 15" x4 ea 15" x 4  Severo           Thoracic extension over chair  3" x 10                                                                            Ther Activity             UBE for UE endurance  10 mins  Alt                                                               Modalities

## 2021-04-22 ENCOUNTER — APPOINTMENT (OUTPATIENT)
Dept: PHYSICAL THERAPY | Facility: CLINIC | Age: 46
End: 2021-04-22
Payer: COMMERCIAL

## 2021-04-28 ENCOUNTER — OFFICE VISIT (OUTPATIENT)
Dept: PHYSICAL THERAPY | Facility: CLINIC | Age: 46
End: 2021-04-28
Payer: COMMERCIAL

## 2021-04-28 DIAGNOSIS — M54.2 NECK PAIN: Primary | ICD-10-CM

## 2021-04-28 PROCEDURE — 97530 THERAPEUTIC ACTIVITIES: CPT

## 2021-04-28 PROCEDURE — 97110 THERAPEUTIC EXERCISES: CPT

## 2021-04-28 PROCEDURE — 97112 NEUROMUSCULAR REEDUCATION: CPT

## 2021-04-28 NOTE — PROGRESS NOTES
Daily Note     Today's date: 2021  Patient name: Simon Wong  : 1975  MRN: [id number]  Referring provider: Tod Oswald MD  Dx:   Encounter Diagnosis     ICD-10-CM    1  Neck pain  M54 2        Start Time: 940          Subjective: Pt reports she was sore after last treatment  C/o tired today with "pulling" in L elbow  Objective: See treatment diary below      Assessment: Tolerated treatment fair  Program modified to patient tolerance today  Pt c/o L elbow discomfort with TB pull downs with decreased resistance  Pt c/o L shoulder discomfort with all prone exercises  Patient demonstrated fatigue post treatment, exhibited good technique with therapeutic exercises and would benefit from continued PT      Plan: Continue per plan of care        Precautions: none  POC exp 21  Manuals                                      Neuro Re-Ed        Seated chin tuck with self OP 3" x10 3" x 10 3"  x15     Seated Postural correction with towel roll instructed for HEP       Scap retractions   5"x20     B/l TB ER   Green  2 x 10  Green  2x10     Supine chin tuck with head lift DNF strengthening        Prone shoulder scaption  Severo  2 x 10 B/L x10 ea     Prone horizontal abduction  Severo   2 x 10 B/L x10 ea     Prone shoulder extension  Severo   2 x 10  B/L x10 ea     TB Pull downs with cues for postural correction  Blue  3 x 10  green x12       TB rows with cues for postural correction  Blue   3 x 10  Green    2x10             Ther Ex        UT stretch 15"x4 ea 15" x 4 Severo 15" x4 B/L     Levator scap stretch 15" x4 ea 15" x 4  Severo 15" x4 B/L     Thoracic extension over chair  3" x 10 5" x20                                             Ther Activity        UBE for UE endurance  10 mins  Alt 120 rpm  5'fwd/  5'retro                                     Modalities

## 2021-05-05 ENCOUNTER — OFFICE VISIT (OUTPATIENT)
Dept: PHYSICAL THERAPY | Facility: CLINIC | Age: 46
End: 2021-05-05
Payer: COMMERCIAL

## 2021-05-05 DIAGNOSIS — M54.2 NECK PAIN: Primary | ICD-10-CM

## 2021-05-05 PROCEDURE — 97110 THERAPEUTIC EXERCISES: CPT | Performed by: PHYSICAL THERAPIST

## 2021-05-05 PROCEDURE — 97112 NEUROMUSCULAR REEDUCATION: CPT | Performed by: PHYSICAL THERAPIST

## 2021-05-05 PROCEDURE — 97530 THERAPEUTIC ACTIVITIES: CPT | Performed by: PHYSICAL THERAPIST

## 2021-05-05 NOTE — PROGRESS NOTES
Daily Note     Today's date: 2021  Patient name: Marizol Delaney  : 1975  MRN: [id number]  Referring provider: Lorrie Reid MD  Dx:   Encounter Diagnosis     ICD-10-CM    1  Neck pain  M54 2                   Subjective: Patient reports she is doing well today  She was a little sore after her second PT session       Objective: See treatment diary below      Assessment: Tolerated treatment well  Patient exhibited good technique with therapeutic exercises and would benefit from continued PT      Plan: Continue per plan of care        Precautions: none  POC exp 21  Manuals                                     Neuro Re-Ed        Seated chin tuck with self OP 3" x10 3" x 10 3"  x15 3''x15     Seated Postural correction with towel roll instructed for HEP       Scap retractions   5"x20 5''20x    B/l TB ER   Green  2 x 10  Green  2x10 Green 2x10     Supine chin tuck with head lift DNF strengthening        Prone shoulder scaption  Severo  2 x 10 B/L x10 ea B/L x 10 each     Prone horizontal abduction  Severo   2 x 10 B/L x10 ea B/L x 10 each     Prone shoulder extension  Severo   2 x 10  B/L x10 ea B/L x 10 each     TB Pull downs with cues for postural correction  Blue  3 x 10  green x12   Green 5''20x     TB rows with cues for postural correction  Blue   3 x 10  Green    2x10 Green 5''20x             Ther Ex        UT stretch 15"x4 ea 15" x 4 Severo 15" x4 B/L 15''x4    Levator scap stretch 15" x4 ea 15" x 4  Severo 15" x4 B/L 15''x4    Thoracic extension over chair  3" x 10 5" x20 5''20x                                            Ther Activity        UBE for UE endurance  10 mins  Alt 120 rpm  5'fwd/  5'retro 120 RPM 5' forward 5' retro                                      Modalities

## 2021-05-07 ENCOUNTER — APPOINTMENT (OUTPATIENT)
Dept: PHYSICAL THERAPY | Facility: CLINIC | Age: 46
End: 2021-05-07
Payer: COMMERCIAL

## 2021-05-11 ENCOUNTER — TRANSCRIBE ORDERS (OUTPATIENT)
Dept: PHYSICAL THERAPY | Facility: CLINIC | Age: 46
End: 2021-05-11

## 2021-05-11 ENCOUNTER — EVALUATION (OUTPATIENT)
Dept: PHYSICAL THERAPY | Facility: CLINIC | Age: 46
End: 2021-05-11
Payer: COMMERCIAL

## 2021-05-11 DIAGNOSIS — M54.2 NECK PAIN: Primary | ICD-10-CM

## 2021-05-11 PROCEDURE — 97112 NEUROMUSCULAR REEDUCATION: CPT | Performed by: PHYSICAL THERAPIST

## 2021-05-11 PROCEDURE — 97110 THERAPEUTIC EXERCISES: CPT | Performed by: PHYSICAL THERAPIST

## 2021-05-11 PROCEDURE — 97164 PT RE-EVAL EST PLAN CARE: CPT | Performed by: PHYSICAL THERAPIST

## 2021-05-11 PROCEDURE — 97530 THERAPEUTIC ACTIVITIES: CPT | Performed by: PHYSICAL THERAPIST

## 2021-05-11 NOTE — PROGRESS NOTES
PT Re-Evaluation  and PT Discharge    Today's date: 2021  Patient name: Chelly Senior  : 1975  MRN: [id number]  Referring provider: Michell Tadeo MD  Dx:   Encounter Diagnosis     ICD-10-CM    1  Neck pain  M54 2                   Assessment  Assessment details: Chelly Senior has attended a total of 4 treatments following her initial examination  She reports little improvement in her condition overall since starting physical therapy services  Patient has had a busy schedule with work and family and has not been able to commit as much time as she would like to PT  She expresses that she still has difficulty with heavy lifting at work and her sx are aggravated with heavy lifting  Instructed patient today on extensive strengthening program for UEs to assist patient in gaining strength to tolerate lifting at work  Provided patient with written HEP and encouraged to perform 2-3 days/week in order to see results  She will not be able to continue PT secondary to time constraints and therefore patient's episode of care will be discontinued at this time   Thank you for the referral    Impairments: abnormal or restricted ROM, activity intolerance, impaired physical strength, pain with function and poor posture   Functional limitations: stiffness and pain during work activity and with lifting, pain and stiffness when using computer or looking at phoneUnderstanding of Dx/Px/POC: good   Prognosis: good    Goals  STGs  1) In 2-4 weeks patient will demonstrate improved postural awareness - MET  2) In 2-4 weeks patient will report 50% reduction in symptoms - partially met  3) In 2-4 weeks patient will demonstrate improved DNF endurance, able to maintain supine cervical retraction with head lift for 10 seconds or more- not met    LTGs  1) In 4-6 weeks patient will demonstrate independence with HEP for maintenance- MET  2) In 4-6 weeks patient will report 75% improved symptoms or better - not met    Plan  Plan details: DC from PT  Referral necessary: No  Treatment plan discussed with: patient        Subjective Evaluation    History of Present Illness  Mechanism of injury: Patient presents to PT with c/o neck stiffness that has been going on for years  She reports the stiffness has just not gotten any better  She cannot think of any specific incident and denies trauma  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  She did have a recent x-ray which showed arthritis  She reports that her arms also feel a little weak when she tries to exercise  She does not notice pain that radiates into her arms  She denies N/T  Denies difficulty speaking or swallowing  Denies lightheadedness or dizziness  Denies nausea or vomiting  Denies any recent unexplained weight loss  Denies headaches  Denies difficulty sleeping or pain at night  Patient reports she feels best first thing in the morning and is fine when she doesn't have to do much  Stiffness comes on throughout her work day  She was prescribed Mobic and started taking this on Monday  She has not noticed much difference since starting this medication  Progress Note 21:  Patient reports she is unsure whether or not she is any better compared to a month ago  She feels that the pain and stiffness depends on her day at work  She feels most aggravated by heavy lifting  Pain  At best pain ratin  At worst pain ratin  Location: back of neck, b/l  Quality: stiffness    Relieving factors: heat and rest  Aggravating factors: lifting and overhead activity    Social Support  Lives with: young children    Employment status: working  Hand dominance: right  Exercise history: no routine exercise      Diagnostic Tests  X-ray: normal  Treatments  Current treatment: physical therapy  Patient Goals  Patient goals for therapy: decreased pain  Patient goal: improve comfort, decrease stiffness        Objective     Concurrent Complaints  Negative for night pain, disturbed sleep, dizziness, faints, headaches, nausea/motion sickness, tinnitus, trouble swallowing, difficulty breathing, shortness of breath, respiratory pain and visual change    Postural Observations  Seated posture: fair  Standing posture: fair  Correction of posture: makes symptoms better        Active Range of Motion   Cervical/Thoracic Spine       Cervical  Subcranial protraction:  WFL   Subcranial retraction:   Restriction level: minimal  Flexion: 35 degrees   Extension: 65 degrees      Left lateral flexion: 45 degrees      Right lateral flexion: 45 degrees      Left rotation: 75 degrees  Right rotation: 75 degrees             Joint Play     Hypomobile: T1, T2, T3, T4, T5, T6 and T7     Strength/Myotome Testing   Cervical Spine     Left   Interossei strength (t1): 5    Right   Interossei strength (t1): 5    Left Shoulder     Planes of Motion   Abduction: 5     Isolated Muscles   Upper trapezius: 5     Right Shoulder     Planes of Motion   Abduction: 5     Isolated Muscles   Upper trapezius: 5     Left Elbow   Flexion: 4+  Extension: 4+    Right Elbow   Flexion: 4+  Extension: 4+    Left Wrist/Hand   Thumb extension: 5    Right Wrist/Hand   Thumb extension: 5    Tests   Cervical   Negative vertical compression, lumbar distraction and neck flexor muscle endurance test      Left   Negative Spurling's Test A  Right   Negative Spurling's Test A  Left Shoulder   Negative ULTT1 and ULTT4  Right Shoulder   Negative ULTT1 and ULTT4  Lumbar   Negative vertical compression     Neuro Exam:     Headaches   Patient reports headaches: No               Precautions: none  POC exp 5/14/21  Manuals 4/2 4/19 4/28 5/5 5/11                                   Neuro Re-Ed        Seated chin tuck with self OP 3" x10 3" x 10 3"  x15 3''x15  Reviewed for HEP   Seated Postural correction with towel roll instructed for HEP       Scap retractions   5"x20 5''20x    B/l TB ER   Green  2 x 10  Green  2x10 Green 2x10 Supine chin tuck with head lift DNF strengthening        Prone shoulder scaption  Severo  2 x 10 B/L x10 ea B/L x 10 each  B/l, 2x10 ea, palms down and thumbs up   Prone horizontal abduction  Severo   2 x 10 B/L x10 ea B/L x 10 each  B/l, 2x10 ea, palms down and thumbs up   Prone shoulder extension  Severo   2 x 10  B/L x10 ea B/L x 10 each   b/l 2x10 palms down   TB Pull downs with cues for postural correction  Blue  3 x 10  green x12   Green 5''20x      TB rows with cues for postural correction  Blue   3 x 10  Green    2x10 Green 5''20x             Ther Ex        UT stretch 15"x4 ea 15" x 4 Severo 15" x4 B/L 15''x4 Reviewed for HEP   Levator scap stretch 15" x4 ea 15" x 4  Severo 15" x4 B/L 15''x4 Reviewed for HEP   Thoracic extension over chair  3" x 10 5" x20 5''20x Reviewed for HEP   Standing shoulder flexion     1# 2x10   Standing shoulder abduction     1# 2x10   Bent over row     5# 2x10                   Ther Activity        UBE for UE endurance  10 mins  Alt 120 rpm  5'fwd/  5'retro 120 RPM 5' forward 5' retro   100 rpm 5'fwd/5'retro                                   Modalities

## 2021-05-11 NOTE — LETTER
May 11, 2021    Francesca Barnhart, [address] [address] [employer] [facility] [address]    Patient: Kayden Doss   YOB: 1975   Date of Visit: 2021     Encounter Diagnosis     ICD-10-CM    1  Neck pain  M54 2        Dear Dr João Alvarado: Thank you for your recent referral of Kayden Doss  Please review the attached evaluation summary from Jerilyn's recent visit  Please verify that you agree with the plan of care by signing the attached order  If you have any questions or concerns, please do not hesitate to call  I sincerely appreciate the opportunity to share in the care of one of your patients and hope to have another opportunity to work with you in the near future  Sincerely,    Ghassan Soriano, PT      Referring Provider:      I certify that I have read the below Plan of Care and certify the need for these services furnished under this plan of treatment while under my care  Francesca Barnhart MD  [address] [place] [address] [employer] [facility] [address]  Via Fax: [phone]          PT Re-Evaluation  and PT Discharge    Today's date: 2021  Patient name: Kayden Doss  : 1975  MRN: [id number]  Referring provider: Dylan Morgan MD  Dx:   Encounter Diagnosis     ICD-10-CM    1  Neck pain  M54 2                   Assessment  Assessment details: Kayden Doss has attended a total of 4 treatments following her initial examination  She reports little improvement in her condition overall since starting physical therapy services  Patient has had a busy schedule with work and family and has not been able to commit as much time as she would like to PT  She expresses that she still has difficulty with heavy lifting at work and her sx are aggravated with heavy lifting  Instructed patient today on extensive strengthening program for UEs to assist patient in gaining strength to tolerate lifting at work   Provided patient with written HEP and encouraged to perform 2-3 days/week in order to see results  She will not be able to continue PT secondary to time constraints and therefore patient's episode of care will be discontinued at this time  Thank you for the referral    Impairments: abnormal or restricted ROM, activity intolerance, impaired physical strength, pain with function and poor posture   Functional limitations: stiffness and pain during work activity and with lifting, pain and stiffness when using computer or looking at phoneUnderstanding of Dx/Px/POC: good   Prognosis: good    Goals  STGs  1) In 2-4 weeks patient will demonstrate improved postural awareness - MET  2) In 2-4 weeks patient will report 50% reduction in symptoms - partially met  3) In 2-4 weeks patient will demonstrate improved DNF endurance, able to maintain supine cervical retraction with head lift for 10 seconds or more- not met    LTGs  1) In 4-6 weeks patient will demonstrate independence with HEP for maintenance- MET  2) In 4-6 weeks patient will report 75% improved symptoms or better - not met    Plan  Plan details: DC from PT  Referral necessary: No  Treatment plan discussed with: patient        Subjective Evaluation    History of Present Illness  Mechanism of injury: Patient presents to PT with c/o neck stiffness that has been going on for years  She reports the stiffness has just not gotten any better  She cannot think of any specific incident and denies trauma  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  She did have a recent x-ray which showed arthritis  She reports that her arms also feel a little weak when she tries to exercise  She does not notice pain that radiates into her arms  She denies N/T  Denies difficulty speaking or swallowing  Denies lightheadedness or dizziness  Denies nausea or vomiting  Denies any recent unexplained weight loss  Denies headaches  Denies difficulty sleeping or pain at night   Patient reports she feels best first thing in the morning and is fine when she doesn't have to do much  Stiffness comes on throughout her work day  She was prescribed Mobic and started taking this on Monday  She has not noticed much difference since starting this medication  Progress Note 21:  Patient reports she is unsure whether or not she is any better compared to a month ago  She feels that the pain and stiffness depends on her day at work  She feels most aggravated by heavy lifting  Pain  At best pain ratin  At worst pain ratin  Location: back of neck, b/l  Quality: stiffness    Relieving factors: heat and rest  Aggravating factors: lifting and overhead activity    Social Support  Lives with: young children    Employment status: working  Hand dominance: right  Exercise history: no routine exercise      Diagnostic Tests  X-ray: normal  Treatments  Current treatment: physical therapy  Patient Goals  Patient goals for therapy: decreased pain  Patient goal: improve comfort, decrease stiffness        Objective     Concurrent Complaints  Negative for night pain, disturbed sleep, dizziness, faints, headaches, nausea/motion sickness, tinnitus, trouble swallowing, difficulty breathing, shortness of breath, respiratory pain and visual change    Postural Observations  Seated posture: fair  Standing posture: fair  Correction of posture: makes symptoms better        Active Range of Motion   Cervical/Thoracic Spine       Cervical  Subcranial protraction:  WFL   Subcranial retraction:   Restriction level: minimal  Flexion: 35 degrees   Extension: 65 degrees      Left lateral flexion: 45 degrees      Right lateral flexion: 45 degrees      Left rotation: 75 degrees  Right rotation: 75 degrees             Joint Play     Hypomobile: T1, T2, T3, T4, T5, T6 and T7     Strength/Myotome Testing   Cervical Spine     Left   Interossei strength (t1): 5    Right   Interossei strength (t1): 5    Left Shoulder     Planes of Motion   Abduction: 5 Isolated Muscles   Upper trapezius: 5     Right Shoulder     Planes of Motion   Abduction: 5     Isolated Muscles   Upper trapezius: 5     Left Elbow   Flexion: 4+  Extension: 4+    Right Elbow   Flexion: 4+  Extension: 4+    Left Wrist/Hand   Thumb extension: 5    Right Wrist/Hand   Thumb extension: 5    Tests   Cervical   Negative vertical compression, lumbar distraction and neck flexor muscle endurance test      Left   Negative Spurling's Test A  Right   Negative Spurling's Test A  Left Shoulder   Negative ULTT1 and ULTT4  Right Shoulder   Negative ULTT1 and ULTT4  Lumbar   Negative vertical compression     Neuro Exam:     Headaches   Patient reports headaches: No               Precautions: none  POC exp 5/14/21  Manuals 4/2 4/19 4/28 5/5 5/11                                   Neuro Re-Ed        Seated chin tuck with self OP 3" x10 3" x 10 3"  x15 3''x15  Reviewed for HEP   Seated Postural correction with towel roll instructed for HEP       Scap retractions   5"x20 5''20x    B/l TB ER   Green  2 x 10  Green  2x10 Green 2x10     Supine chin tuck with head lift DNF strengthening        Prone shoulder scaption  Severo  2 x 10 B/L x10 ea B/L x 10 each  B/l, 2x10 ea, palms down and thumbs up   Prone horizontal abduction  Severo   2 x 10 B/L x10 ea B/L x 10 each  B/l, 2x10 ea, palms down and thumbs up   Prone shoulder extension  Severo   2 x 10  B/L x10 ea B/L x 10 each   b/l 2x10 palms down   TB Pull downs with cues for postural correction  Blue  3 x 10  green x12   Green 5''20x      TB rows with cues for postural correction  Blue   3 x 10  Green    2x10 Green 5''20x             Ther Ex        UT stretch 15"x4 ea 15" x 4 Severo 15" x4 B/L 15''x4 Reviewed for HEP   Levator scap stretch 15" x4 ea 15" x 4  Severo 15" x4 B/L 15''x4 Reviewed for HEP   Thoracic extension over chair  3" x 10 5" x20 5''20x Reviewed for HEP   Standing shoulder flexion     1# 2x10   Standing shoulder abduction     1# 2x10   Bent over row 5# 2x10                   Ther Activity        UBE for UE endurance  10 mins  Alt 120 rpm  5'fwd/  5'retro 120 RPM 5' forward 5' retro   100 rpm 5'fwd/5'retro                                   Modalities

## 2021-05-14 ENCOUNTER — APPOINTMENT (OUTPATIENT)
Dept: PHYSICAL THERAPY | Facility: CLINIC | Age: 46
End: 2021-05-14
Payer: COMMERCIAL

## 2021-06-30 ENCOUNTER — OFFICE VISIT (OUTPATIENT)
Dept: FAMILY MEDICINE CLINIC | Facility: CLINIC | Age: 46
End: 2021-06-30
Payer: COMMERCIAL

## 2021-06-30 VITALS
SYSTOLIC BLOOD PRESSURE: 110 MMHG | HEART RATE: 77 BPM | RESPIRATION RATE: 20 BRPM | BODY MASS INDEX: 26.61 KG/M2 | DIASTOLIC BLOOD PRESSURE: 72 MMHG | WEIGHT: 144.6 LBS | TEMPERATURE: 98.2 F | OXYGEN SATURATION: 99 % | HEIGHT: 62 IN

## 2021-06-30 DIAGNOSIS — Z12.4 SCREENING FOR CERVICAL CANCER: ICD-10-CM

## 2021-06-30 DIAGNOSIS — E55.9 VITAMIN D DEFICIENCY: Primary | ICD-10-CM

## 2021-06-30 PROCEDURE — 1036F TOBACCO NON-USER: CPT | Performed by: FAMILY MEDICINE

## 2021-06-30 PROCEDURE — 99213 OFFICE O/P EST LOW 20 MIN: CPT | Performed by: FAMILY MEDICINE

## 2021-06-30 PROCEDURE — 3725F SCREEN DEPRESSION PERFORMED: CPT | Performed by: FAMILY MEDICINE

## 2021-06-30 NOTE — PROGRESS NOTES
Assessment/Plan:       Problem List Items Addressed This Visit     None      Visit Diagnoses     Vitamin D deficiency    -  Primary    Screening for cervical cancer        Relevant Orders    Ambulatory referral to Obstetrics / Gynecology            Subjective:      Patient ID: Dani Severin is a 54 y o  female  HPI     The patient presents today for follow-up and to discuss recent blood work results  Blood work reviewed and normal except for as outlined below:     Vitamin D insufficiency- Vitamin D level low 29  Recommend an OTC Vitamin D supplement 1,000 units daily  She is being worked up for her neck pain and elbow pain, going through physical therapy, not having any pain now, plan for EMG  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, 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  Increased stress due to this, going for massage today to try and relax  No suicidal thoughts  PHQ-9 Depression Screening    PHQ-9:   Frequency of the following problems over the past two weeks:      Little interest or pleasure in doing things: 0 - not at all  Feeling down, depressed, or hopeless: 0 - not at all  PHQ-2 Score: 0         The following portions of the patient's history were reviewed and updated as appropriate: allergies, current medications, past family history, past medical history, past social history, past surgical history and problem list     Review of Systems   Constitutional: Negative for activity change, appetite change, chills and fever  Respiratory: Negative for cough, chest tightness, shortness of breath and wheezing  Cardiovascular: Negative for chest pain and palpitations  Gastrointestinal: Negative for abdominal pain, constipation, diarrhea, nausea and vomiting  Genitourinary: Negative for decreased urine volume, difficulty urinating and dysuria  Neurological: Negative for dizziness, light-headedness and headaches  Psychiatric/Behavioral: Negative for dysphoric mood   The patient is not nervous/anxious  Objective:      /72 (BP Location: Left arm, Patient Position: Sitting, Cuff Size: Standard)   Pulse 77   Temp 98 2 °F (36 8 °C)   Resp 20   Ht 5' 2" (1 575 m)   Wt 65 6 kg (144 lb 9 6 oz)   SpO2 99%   BMI 26 45 kg/m²          Physical Exam  Vitals reviewed  Constitutional:       General: She is not in acute distress  Appearance: Normal appearance  She is not ill-appearing, toxic-appearing or diaphoretic  HENT:      Head: Normocephalic and atraumatic  Eyes:      General:         Right eye: No discharge  Left eye: No discharge  Extraocular Movements: Extraocular movements intact  Conjunctiva/sclera: Conjunctivae normal    Cardiovascular:      Rate and Rhythm: Normal rate and regular rhythm  Heart sounds: Normal heart sounds  No murmur heard  No friction rub  No gallop  Pulmonary:      Effort: Pulmonary effort is normal  No respiratory distress  Breath sounds: Normal breath sounds  No stridor  No wheezing or rhonchi  Musculoskeletal:         General: No swelling, tenderness or signs of injury  Cervical back: Normal range of motion and neck supple  No rigidity  Right lower leg: No edema  Left lower leg: No edema  Skin:     General: Skin is warm  Coloration: Skin is not pale  Findings: No erythema or rash  Neurological:      Mental Status: She is alert and oriented to person, place, and time  Motor: No weakness     Psychiatric:         Mood and Affect: Mood normal          Behavior: Behavior normal            Mildred Bell DO  [facility]

## 2021-06-30 NOTE — PATIENT INSTRUCTIONS
Nutrition: How to Make Lizzie Dimas6  A healthy diet has a lot of benefits  It can prevent certain health conditions like heart disease and cancer, and it can lower your cholesterol  It can give you more energy, help you focus, and improve your mood  It can also help you lose weight or stay at a healthy weight  Path to Improved Health  The choices you make about what you eat and drink matter  They should add up to a balanced, nutritious diet  We all have different calorie needs based on our age, sex, and activity level  Health conditions can have a role, too  Fruits and Vegetables  Fruits and vegetables are rich in fiber, vitamins, and minerals  They should be the basis of your diet  Try to get many different colors of fruits and vegetables each day to add flavor and variety  Fruits and vegetables should cover half of your plate at each meal  Try not to add saturated fats and sugar to vegetables and fruits  This means avoiding margarine, butter, mayonnaise, and sour cream  You can use yogurt, healthy oils (such as canola or olive oil), or herbs instead  Potatoes and corn are not considered vegetables  Your body processes them more like grains  FRUITS & VEGETABLES   INSTEAD OF THIS: TRY THIS:   Regular or fried vegetables served with cream, cheese, or butter Raw, steamed, boiled, sautéed, or baked vegetables tossed with olive oil, salt, and pepper, or with onions or spices added (like garlic and cumin)   Fruits served with cream cheese or sugary sauces Fresh fruit with peanut, almond, or cashew butter or plain yogurt   Fried potatoes, including french fries, hash browns, and potato chips Baked sweet potatoes or other vegetables     Grains  Choose products that list whole grains as the first ingredient  Whole grains are high in fiber, protein, and vitamins  They are digested slowly, which helps you feel full longer and keeps you from overeating  Avoid products that say enriched    Hot cereals like oatmeal are usually low in saturated fat  However, instant cereals with cream may contain processed oils and can be high in sugar  Granola cereals usually contain a lot of sugar  Cold cereals are generally made with refined grains and are high in sugars  Look for whole-grain, low-sugar options instead  Try not to eat rich sweets, such as doughnuts, rolls, and muffins  Consider fruit or a piece of dark chocolate instead to satisfy your sweet tooth  GRAINS   INSTEAD OF THIS: TRY THIS:   Croissants, rolls, biscuits, and white breads Whole-grain breads, including wheat, rye, and pumpernickel   Doughnuts, pastries, and scones Whole-grain English muffins and small whole-grain bagels   Fried tortillas Soft tortillas (corn or whole wheat) without trans fats   Sugary cereals and regular granola Whole-grain cereal, oatmeal, and reduced-sugar granola   Snack crackers Whole-grain crackers   Potato or corn chips and buttered popcorn Unbuttered popcorn   White pasta Whole-wheat pasta   White rice Brown or wild rice   Fried rice or pasta mixes Brown rice or whole-grain pasta with low-sodium vegetable sauce   All-purpose white flour Whole-wheat flour     Protein  Protein can come from animal and vegetable sources  People who get more of their protein from animal sources tend to have more health problems that can lead to illness and early death  It is healthier to eat meat less often and get most of your protein from plant sources  When you eat meat, choose leaner cuts  Vegetable Protein Sources  There are many ways to get protein in your diet even if you do not eat meat  Most vegetables have some protein  When you eat these vegetables with whole grains, seeds, nuts, and especially beans, you can get a good amount of protein  You can swap beans for meat in recipes like lasagna or chili  Soy foods such as tofu, tempeh, and edamame are also good sources of protein      Beef, Pork, Veal, and [employer] and veal cuts have the words loin or round in their names  Lean pork cuts have the words loin or leg in their names  Trim off the outside fat before cooking the meat  Trim any inside fat before eating it  Use herbs, spices, and low-sodium marinades to season meat  Baking, broiling, grilling, and roasting are the healthiest ways to cook meats  Lean cuts can be panbroiled or stir-fried  Use a nonstick pan, canola oil, or olive oil instead of butter or margarine  Don't serve meat with high-fat sauces and gravies  Poultry  Chicken breasts are a good choice because they are low in fat and high in protein  Only eat duck and goose once in a while, because they are higher in saturated fat  Remove skin and visible fat before cooking  Baking, broiling, grilling, and roasting are the healthiest ways to [employer]  Skinless poultry can be pan broiled or stir fried  Use a nonstick pan, canola oil, or olive oil instead of butter or margarine  Seafood  Most seafood is high in healthy polyunsaturated fats  Healthy omega-3 fatty acids also are found in some fish, such as salmon and cold-water trout  If good-quality fresh fish isn't available, buy frozen fish  To prepare fish, you should poach, steam, bake, broil, or grill it      PROTEIN   INSTEAD OF THIS: TRY THIS:   Prime and marbled cuts of meat Select-grade lean beef, such as round, sirloin, and loin cuts   Pork spare ribs and sanders Lean pork, such as tenderloin and loin chop, turkey sanders, tofu sanders   Regular ground beef Lean or extra-lean ground beef, ground chicken or turkey, tempeh, or beans   Lunch meats, such as pepperoni, salami, bologna, and liverwurst Lean lunch meats, such as turkey, chicken, and ham   Regular hot dogs and sausage Fat-free hot dogs, turkey dogs, tofu hot dogs   Breaded fish sticks and cakes, fish canned in oil, or seafood prepared with butter or served with high-fat sauce Fish (fresh, frozen, or canned in water), grilled fish sticks and cakes, or shellfish     Dairy  Choose low-fat, skim, or nondairy milk, such as soy, rice, or almond milk  Try low-fat or part-skim cheeses and other dairy products, or choose smaller portions of foods high in saturated fat  Yogurt can replace sour cream in many recipes  It is important to pick yogurt without added sugar  Try mixing yogurt with fruit for dessert  Sorbet and frozen yogurt are lower in fat than ice cream     DAIRY   INSTEAD OF THIS: TRY THIS:   Whole milk Skim (nonfat), 1% or 2% (low fat), or nondairy milk, such as soy, rice, almond, or cashew milk   Cream or evaporated milk Evaporated skim milk   Regular buttermilk Low-fat buttermilk   Yogurt made with whole milk Low-fat or nonfat yogurt   Regular cheese, including American, blue, Brie, cheddar, Sami, and Parmesan Low-fat cheese with less than 3 g fat per serving, or nondairy soy cheese   Regular cottage cheese Low-fat cottage cheese (less than 2% fat)   Regular cream cheese Low-fat cream cheese with less than 3 g fat per 1 oz, or skim ricotta   Ice cream Sorbet, sherbet, or frozen yogurt with less than 3 g fat per ½-cup serving     Fats and Oils  Although high-fat foods are higher in calories, they can help you feel satisfied with eating less  Don't be afraid to have fats in your diet, but try to limit saturated and trans fats  You need saturated and unsaturated fats in your diet, but most Americans get too much saturated fat  Heart disease, diabetes, some cancers, and arthritis have been linked to diets high in saturated fat, particularly saturated fats from animal products  FATS & OILS   INSTEAD OF THIS: TRY THIS:   Cookies Fruit or whole-grain cookies   Shortening, butter, and margarine Olive, canola, and soybean oils   Regular mayonnaise Yogurt   Regular salad dressing Vinaigrette with olive oil and vinegar   Butter or fat to grease pans Nonstick cooking spray, olive oil, or canola oil     Beverages  It is important that you stay hydrated   However, drinks that contain sugar are not healthy  This includes fruit juices, soda, sports and energy drinks, sweetened or flavored milk, and sweet tea  Artificial sweeteners may also be bad for your health  Drink mostly water or other unsweetened drinks  Don't drink too much alcohol  Women should have no more than one drink per day  Men should have no more than two drinks per day  More information  American Academy of Family [employer]icians Patient Information Resource   https://familydoctor  org/dietary-fats-whats-good-and-whats-bad/ and https://familydoctor  org/nutrition-tips-for-improving-your-health/  [employer] Eating Plate   [facility]   Department of Agriculture, Choose My Plate   [employer] com br

## 2021-07-06 ENCOUNTER — OFFICE VISIT (OUTPATIENT)
Dept: OBGYN CLINIC | Facility: CLINIC | Age: 46
End: 2021-07-06
Payer: COMMERCIAL

## 2021-07-06 VITALS
DIASTOLIC BLOOD PRESSURE: 70 MMHG | WEIGHT: 146 LBS | SYSTOLIC BLOOD PRESSURE: 100 MMHG | HEIGHT: 62 IN | BODY MASS INDEX: 26.87 KG/M2

## 2021-07-06 DIAGNOSIS — Z01.419 WELL FEMALE EXAM WITH ROUTINE GYNECOLOGICAL EXAM: Primary | ICD-10-CM

## 2021-07-06 DIAGNOSIS — Z12.4 SCREENING FOR CERVICAL CANCER: ICD-10-CM

## 2021-07-06 DIAGNOSIS — Z12.31 ENCOUNTER FOR SCREENING MAMMOGRAM FOR MALIGNANT NEOPLASM OF BREAST: ICD-10-CM

## 2021-07-06 PROCEDURE — G0145 SCR C/V CYTO,THINLAYER,RESCR: HCPCS | Performed by: ADVANCED PRACTICE MIDWIFE

## 2021-07-06 PROCEDURE — S0610 ANNUAL GYNECOLOGICAL EXAMINA: HCPCS | Performed by: ADVANCED PRACTICE MIDWIFE

## 2021-07-06 PROCEDURE — 3008F BODY MASS INDEX DOCD: CPT | Performed by: FAMILY MEDICINE

## 2021-07-06 PROCEDURE — G0476 HPV COMBO ASSAY CA SCREEN: HCPCS | Performed by: ADVANCED PRACTICE MIDWIFE

## 2021-07-06 NOTE — PROGRESS NOTES
[facility] [address] [place] [address]    ASSESSMENT/PLAN: Nery Grant is a 54 y o   who presents for annual gynecologic exam     Encounter for routine gynecologic examination  - Routine well woman exam completed today  - Cervical Cancer Screening: Current ASCCP Guidelines reviewed  Last Pap: Not on file  Pap smear done as part of visit  - HPV Vaccination status: unknown status  - STI screening offered including HIV: not indicated based on hx or requested  - Contraceptive counseling discussed  Current contraception: hysterectomy   - Breast Cancer Screening: Last Mammogram Not on file, script given at time of visit  - RTO 1 yr    Additional problems addressed at this visit:  1  Well female exam with routine gynecological exam  -     Liquid-based pap, screening    2  Screening for cervical cancer  -     Ambulatory referral to Obstetrics / Gynecology    3  Encounter for screening mammogram for malignant neoplasm of breast  -     Mammo screening bilateral w 3d & cad; Future; Expected date: 2021          CC: Annual Gynecologic Examination    HPI: Nery Grant is a 54 y o   who presents for annual gynecologic examination  Margareth Carlton presents today for gyn exam  She is S/P hysterectomy for strong family hx of uterine cancer  She denies gyn concerns  States that she had abn mammogram in the past, but resolved and is due for screening mammogram  Sleep pattern 6-8 hours most nights  2-3 servings of calcium rich foods daily  Minimal exercise  1 large coffee daily  Does not perform SBE  The following portions of the patient's history were reviewed and updated as appropriate: allergies, current medications, past family history, past medical history, obstetric history, gynecologic history, past social history, past surgical history and problem list     Review of Systems   Constitutional: Negative for activity change, appetite change, fatigue and fever     Respiratory: Negative for cough and shortness of breath  Cardiovascular: Negative for chest pain, palpitations and leg swelling  Gastrointestinal: Negative for constipation and diarrhea  Genitourinary: Negative for difficulty urinating, dysuria, frequency, pelvic pain, vaginal bleeding, vaginal discharge and vaginal pain  Neurological: Negative for light-headedness and headaches  Psychiatric/Behavioral: The patient is not nervous/anxious  Objective:  /70   Ht 5' 2" (1 575 m)   Wt 66 2 kg (146 lb)   BMI 26 70 kg/m²    Physical Exam  Vitals reviewed  Constitutional:       Appearance: Normal appearance  HENT:      Head: Normocephalic  Neck:      Thyroid: No thyroid mass or thyroid tenderness  Cardiovascular:      Rate and Rhythm: Normal rate and regular rhythm  Heart sounds: Normal heart sounds  Pulmonary:      Effort: Pulmonary effort is normal       Breath sounds: Normal breath sounds  Chest:      Breasts:         Right: No mass, nipple discharge, skin change or tenderness  Left: No mass, nipple discharge, skin change or tenderness  Abdominal:      General: There is no distension  Palpations: There is no mass  Tenderness: There is no abdominal tenderness  There is no guarding  Genitourinary:     General: Normal vulva  Exam position: Lithotomy position  Labia:         Right: No tenderness or lesion  Left: No tenderness or lesion  Vagina: No vaginal discharge, tenderness, bleeding or lesions  Uterus: Absent  Adnexa:         Right: No mass, tenderness or fullness  Left: No mass, tenderness or fullness  Comments: Vaginal cuff pink, moist, without lesion  Musculoskeletal:      Cervical back: Normal range of motion  Lymphadenopathy:      Upper Body:      Right upper body: No axillary adenopathy  Left upper body: No axillary adenopathy  Skin:     General: Skin is warm and dry     Neurological:      Mental Status: She is alert     Psychiatric:         Mood and Affect: Mood normal          Behavior: Behavior normal          Judgment: Judgment normal              Sp Quesada CNM  [facility]  07/06/21 12:12 PM

## 2021-07-07 LAB
HPV HR 12 DNA CVX QL NAA+PROBE: NEGATIVE
HPV16 DNA CVX QL NAA+PROBE: NEGATIVE
HPV18 DNA CVX QL NAA+PROBE: NEGATIVE

## 2021-07-12 LAB
LAB AP GYN PRIMARY INTERPRETATION: NORMAL
Lab: NORMAL

## 2022-02-15 ENCOUNTER — OFFICE VISIT (OUTPATIENT)
Dept: FAMILY MEDICINE CLINIC | Facility: CLINIC | Age: 47
End: 2022-02-15
Payer: COMMERCIAL

## 2022-02-15 VITALS
HEART RATE: 76 BPM | WEIGHT: 145.2 LBS | DIASTOLIC BLOOD PRESSURE: 60 MMHG | RESPIRATION RATE: 18 BRPM | TEMPERATURE: 99.5 F | BODY MASS INDEX: 26.72 KG/M2 | HEIGHT: 62 IN | SYSTOLIC BLOOD PRESSURE: 102 MMHG | OXYGEN SATURATION: 99 %

## 2022-02-15 DIAGNOSIS — R10.9 ABDOMINAL CRAMPING: ICD-10-CM

## 2022-02-15 DIAGNOSIS — E55.9 VITAMIN D DEFICIENCY: ICD-10-CM

## 2022-02-15 DIAGNOSIS — Z13.6 ENCOUNTER FOR LIPID SCREENING FOR CARDIOVASCULAR DISEASE: ICD-10-CM

## 2022-02-15 DIAGNOSIS — Z12.11 SCREENING FOR COLON CANCER: ICD-10-CM

## 2022-02-15 DIAGNOSIS — Z00.00 ANNUAL PHYSICAL EXAM: Primary | ICD-10-CM

## 2022-02-15 DIAGNOSIS — Z13.220 ENCOUNTER FOR LIPID SCREENING FOR CARDIOVASCULAR DISEASE: ICD-10-CM

## 2022-02-15 DIAGNOSIS — R53.83 FATIGUE, UNSPECIFIED TYPE: ICD-10-CM

## 2022-02-15 PROCEDURE — 99396 PREV VISIT EST AGE 40-64: CPT | Performed by: FAMILY MEDICINE

## 2022-02-15 PROCEDURE — 3725F SCREEN DEPRESSION PERFORMED: CPT | Performed by: FAMILY MEDICINE

## 2022-02-15 PROCEDURE — 3008F BODY MASS INDEX DOCD: CPT | Performed by: FAMILY MEDICINE

## 2022-02-15 PROCEDURE — 1036F TOBACCO NON-USER: CPT | Performed by: FAMILY MEDICINE

## 2022-02-15 RX ORDER — KETOCONAZOLE 20 MG/G
CREAM TOPICAL
COMMUNITY
Start: 2021-10-29

## 2022-02-15 RX ORDER — MELOXICAM 15 MG/1
15 TABLET ORAL DAILY
COMMUNITY
Start: 2022-01-25 | End: 2022-07-25 | Stop reason: SDUPTHER

## 2022-02-15 NOTE — PROGRESS NOTES
Assessment/Plan:       Problem List Items Addressed This Visit     None            Subjective:      Patient ID: Sanjay Lujan is a 51 y o  female      HPI    The following portions of the patient's history were reviewed and updated as appropriate: allergies, current medications, past family history, past medical history, past social history, past surgical history, and problem list     Review of Systems      Objective:      /60 (BP Location: Left arm, Patient Position: Sitting, Cuff Size: Standard)   Pulse 76   Temp 99 5 °F (37 5 °C)   Resp 18   Ht 5' 2" (1 575 m)   Wt 65 9 kg (145 lb 3 2 oz)   SpO2 99%   BMI 26 56 kg/m²          Physical Exam        Angela Braun,   [address] [facility]

## 2022-02-15 NOTE — PATIENT INSTRUCTIONS

## 2022-02-15 NOTE — PROGRESS NOTES
[facility]    NAME: Tomas Gross  AGE: 51 y o  SEX: female  : 1975     DATE: 2/15/2022     Assessment and Plan:     Problem List Items Addressed This Visit        Other    Abdominal cramping    Relevant Orders    Urinalysis with microscopic    Urine culture    Fatigue    Relevant Orders    CBC and differential    Comprehensive metabolic panel    TSH, 3rd generation with Free T4 reflex    Urinalysis with microscopic    Urine culture      Other Visit Diagnoses     Annual physical exam    -  Primary    Vitamin D deficiency        Relevant Orders    Vitamin D 25 hydroxy    Encounter for lipid screening for cardiovascular disease        Relevant Orders    Lipid Panel with Direct LDL reflex    Screening for colon cancer        Relevant Orders    Ambulatory Referral to Gastroenterology          Immunizations and preventive care screenings were discussed with patient today  Appropriate education was printed on patient's after visit summary  Counseling:  Alcohol/drug use: discussed moderation in alcohol intake, the recommendations for healthy alcohol use, and avoidance of illicit drug use  Dental Health: discussed importance of regular tooth brushing, flossing, and dental visits  Injury prevention: discussed safety/seat belts, safety helmets, smoke detectors, carbon dioxide detectors, and smoking near bedding or upholstery  Sexual health: discussed sexually transmitted diseases, partner selection, use of condoms, avoidance of unintended pregnancy, and contraceptive alternatives  · Exercise: the importance of regular exercise/physical activity was discussed  Recommend exercise 3-5 times per week for at least 30 minutes  BMI Counseling: Body mass index is 26 56 kg/m²   The BMI is above normal  Nutrition recommendations include decreasing portion sizes, encouraging healthy choices of fruits and vegetables, decreasing fast food intake, consuming healthier snacks, moderation in carbohydrate intake, increasing intake of lean protein and reducing intake of saturated and trans fat  Exercise recommendations include moderate physical activity 150 minutes/week and exercising 3-5 times per week  Rationale for BMI follow-up plan is due to patient being overweight or obese  Depression Screening and Follow-up Plan: Patient was screened for depression during today's encounter  They screened negative with a PHQ-2 score of 0  No follow-ups on file  Chief Complaint:     Chief Complaint   Patient presents with    Follow-up     Patient reports feeling run down, achey  Had fever approx 2 weeks ago  Covid tests neg      History of Present Illness:     Adult Annual Physical   Patient here for a comprehensive physical exam  The patient reports-     Was feeling run down, right nostril pain, 100 6 highest fever, felt really tired and hot  COVID negative  Feeling mostly back to normal now  Would like blood work to make sure everything looks okay  She gets cramping in lower abdomen, normal bowel movements, no UTI symptoms  Occurring more often, once a week sometimes, since November  Prior hysterectomy, ovaries intact  Diet and Physical Activity  · Diet/Nutrition: poor diet, frequent junk food and limited fruits/vegetables  · Exercise: no formal exercise  Very active job at work, nursing        Depression Screening  PHQ-2/9 Depression Screening    Little interest or pleasure in doing things: 0 - not at all  Feeling down, depressed, or hopeless: 0 - not at all  Trouble falling or staying asleep, or sleeping too much: 0 - not at all  Feeling tired or having little energy: 0 - not at all  Poor appetite or overeatin - not at all  Feeling bad about yourself - or that you are a failure or have let yourself or your family down: 0 - not at all  Trouble concentrating on things, such as reading the newspaper or watching television: 0 - not at all  Moving or speaking so slowly that other people could have noticed  Or the opposite - being so fidgety or restless that you have been moving around a lot more than usual: 0 - not at all  Thoughts that you would be better off dead, or of hurting yourself in some way: 0 - not at all  PHQ-2 Score: 0  PHQ-2 Interpretation: Negative depression screen  PHQ-9 Score: 0   PHQ-9 Interpretation: No or Minimal depression        General Health  · Sleep: sleeps poorly  · Hearing: normal - bilateral   · Vision: no vision problems  No recent eye exam    · Dental: regular dental visits  /GYN Health  · Patient is: Prior hysterectomy  · Last menstrual period: As above   · States mammogram UTD, will track down  Review of Systems:     Review of Systems   All other systems reviewed and are negative  Past Medical History:     Past Medical History:   Diagnosis Date    Allergic     seasonal    Arthritis       Past Surgical History:     Past Surgical History:   Procedure Laterality Date     SECTION      HYSTERECTOMY        Social History:     Social History     Socioeconomic History    Marital status: Single     Spouse name: None    Number of children: None    Years of education: None    Highest education level: None   Occupational History    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[facility] [address] Use    Smoking status: Former Smoker     Quit date:      Years since quittin 1    Smokeless tobacco: Never Used   Vaping Use    Vaping Use: Never used   Substance and Sexual Activity    Alcohol use:  Yes    Drug use: Never    Sexual activity: Not Currently   Other Topics Concern    None   Social History Narrative    None     Social Determinants of Health     Financial Resource Strain: Not on file   Food Insecurity: Not on file   Transportation Needs: Not on file   Physical Activity: Not on file   Stress: Not on file   Social Connections: Not on file   Intimate Partner Violence: Not on file   Housing Stability: Not on file      Family History:     Family History   Problem Relation Age of Onset    Uterine cancer Mother     Arthritis Mother     Leukemia Father     Fibroids Sister     No Known Problems Daughter     No Known Problems Sister     Uterine cancer Maternal Aunt       Current Medications:     Current Outpatient Medications   Medication Sig Dispense Refill    ketoconazole (NIZORAL) 2 % cream       meloxicam (MOBIC) 15 mg tablet Take 15 mg by mouth daily       No current facility-administered medications for this visit  Allergies:     No Known Allergies   Physical Exam:     /60 (BP Location: Left arm, Patient Position: Sitting, Cuff Size: Standard)   Pulse 76   Temp 99 5 °F (37 5 °C)   Resp 18   Ht 5' 2" (1 575 m)   Wt 65 9 kg (145 lb 3 2 oz)   SpO2 99%   BMI 26 56 kg/m²     Physical Exam  Vitals reviewed  Constitutional:       General: She is not in acute distress  Appearance: Normal appearance  She is not ill-appearing, toxic-appearing or diaphoretic  Eyes:      General:         Right eye: No discharge  Left eye: No discharge  Extraocular Movements: Extraocular movements intact  Conjunctiva/sclera: Conjunctivae normal    Cardiovascular:      Rate and Rhythm: Normal rate and regular rhythm  Heart sounds: Normal heart sounds  No murmur heard  No friction rub  No gallop  Pulmonary:      Effort: Pulmonary effort is normal  No respiratory distress  Breath sounds: Normal breath sounds  No stridor  No wheezing or rhonchi  Abdominal:      General: Bowel sounds are normal  There is no distension  Palpations: Abdomen is soft  There is no mass  Tenderness: There is no abdominal tenderness  There is no guarding or rebound  Musculoskeletal:         General: No swelling, tenderness or signs of injury  Right lower leg: No edema  Left lower leg: No edema  Skin:     General: Skin is warm  Coloration: Skin is not pale  Findings: No erythema or rash  Neurological:      Mental Status: She is alert and oriented to person, place, and time  Motor: No weakness     Psychiatric:         Mood and Affect: Mood normal          Behavior: Behavior normal           DO Kimmy Santo

## 2022-02-16 PROBLEM — R10.9 ABDOMINAL CRAMPING: Status: ACTIVE | Noted: 2022-02-16

## 2022-02-16 PROBLEM — R53.83 FATIGUE: Status: ACTIVE | Noted: 2022-02-16

## 2022-04-26 ENCOUNTER — OFFICE VISIT (OUTPATIENT)
Dept: URGENT CARE | Facility: CLINIC | Age: 47
End: 2022-04-26
Payer: COMMERCIAL

## 2022-04-26 VITALS — TEMPERATURE: 98 F | HEART RATE: 90 BPM | BODY MASS INDEX: 26.52 KG/M2 | WEIGHT: 145 LBS | OXYGEN SATURATION: 98 %

## 2022-04-26 DIAGNOSIS — R50.9 FEVER, UNSPECIFIED FEVER CAUSE: ICD-10-CM

## 2022-04-26 DIAGNOSIS — R68.89 FLU-LIKE SYMPTOMS: Primary | ICD-10-CM

## 2022-04-26 PROCEDURE — 87636 SARSCOV2 & INF A&B AMP PRB: CPT | Performed by: NURSE PRACTITIONER

## 2022-04-26 PROCEDURE — 99213 OFFICE O/P EST LOW 20 MIN: CPT

## 2022-04-26 NOTE — PROGRESS NOTES
[address] [facility]        NAME: Nena Gifford is a 51 y o  female  : 1975    MRN: [id number]  DATE: 2022  TIME: 12:59 PM    Assessment and Plan   Flu-like symptoms [R68 89]  1  Flu-like symptoms  Covid/Flu-Office Collect   2  Fever, unspecified fever cause  Covid/Flu-Office Collect         Patient Instructions       Follow up with PCP in 3-5 days  Proceed to  ER if symptoms worsen  Symptomatic relief as discussed  Gatorade or pedialyte  Rest fluids  Download SL mychart for the results in 24 hours - you will be notified if they are abnormal  Follow up with your PCP  Go to the ED if symptoms worsen          Chief Complaint     Chief Complaint   Patient presents with    Fever    Cough    Nausea         History of Present Illness       This is a 51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 and over the weekend developed fever, cough, nausea  She is concerned she may have flu or covid - she is asking for testing  She has been taking zofran at home  Review of Systems   Review of Systems   Constitutional: Positive for fever  HENT: Negative  Eyes: Negative  Respiratory: Negative  Cardiovascular: Negative  Gastrointestinal: Positive for diarrhea, nausea and vomiting  Endocrine: Negative  Genitourinary: Negative  Musculoskeletal: Positive for myalgias  Skin: Negative  Allergic/Immunologic: Negative  Neurological: Negative  Hematological: Negative  Psychiatric/Behavioral: Negative            Current Medications       Current Outpatient Medications:     meloxicam (MOBIC) 15 mg tablet, Take 15 mg by mouth daily, Disp: , Rfl:     ketoconazole (NIZORAL) 2 % cream, , Disp: , Rfl:     Current Allergies     Allergies as of 2022    (No Known Allergies)            The following portions of the patient's history were reviewed and updated as appropriate: allergies, current medications, past family history, past medical history, past social history, past surgical history and problem list      Past Medical History:   Diagnosis Date    Allergic     seasonal    Arthritis        Past Surgical History:   Procedure Laterality Date     SECTION      HYSTERECTOMY         Family History   Problem Relation Age of Onset    Uterine cancer Mother     Arthritis Mother     Leukemia Father     Fibroids Sister     No Known Problems Daughter     No Known Problems Sister     Uterine cancer Maternal Aunt          Medications have been verified  Objective   Pulse 90   Temp 98 °F (36 7 °C)   Wt 65 8 kg (145 lb)   SpO2 98%   BMI 26 52 kg/m²   No LMP recorded  Patient has had a hysterectomy  Physical Exam     Physical Exam  Vitals and nursing note reviewed  Constitutional:       General: She is not in acute distress  Appearance: Normal appearance  She is normal weight  She is not ill-appearing, toxic-appearing or diaphoretic  HENT:      Head: Normocephalic and atraumatic  Right Ear: Tympanic membrane and ear canal normal       Left Ear: Tympanic membrane and ear canal normal       Nose: Nose normal  No congestion or rhinorrhea  Mouth/Throat:      Mouth: Mucous membranes are moist       Pharynx: Oropharynx is clear  No oropharyngeal exudate or posterior oropharyngeal erythema  Eyes:      Extraocular Movements: Extraocular movements intact  Cardiovascular:      Rate and Rhythm: Normal rate  Pulses: Normal pulses  Heart sounds: Normal heart sounds  Pulmonary:      Effort: Pulmonary effort is normal       Breath sounds: Normal breath sounds  Abdominal:      General: There is no distension  Palpations: Abdomen is soft  Tenderness: There is no abdominal tenderness  Musculoskeletal:         General: Normal range of motion  Cervical back: Normal range of motion and neck supple  Skin:     General: Skin is warm and dry  Capillary Refill: Capillary refill takes less than 2 seconds  Neurological:      General: No focal deficit present  Mental Status: She is alert and oriented to person, place, and time  Psychiatric:         Mood and Affect: Mood normal          Behavior: Behavior normal          Thought Content:  Thought content normal          Judgment: Judgment normal

## 2022-04-26 NOTE — PATIENT INSTRUCTIONS
Symptomatic relief as discussed  Gatorade or pedialyte  Rest fluids  Download SL mychart for the results in 24 hours - you will be notified if they are abnormal  Follow up with your PCP  Go to the ED if symptoms worsen    Gastroenteritis   WHAT YOU NEED TO KNOW:   Gastroenteritis, or stomach flu, is an infection of the stomach and intestines  DISCHARGE INSTRUCTIONS:   Call 911 for any of the following:   · You have trouble breathing or a very fast pulse  Return to the emergency department if:   · You see blood in your diarrhea  · You cannot stop vomiting  · You have not urinated for 12 hours  · You feel like you are going to faint  Contact your healthcare provider if:   · You have a fever  · You continue to vomit or have diarrhea, even after treatment  · You see worms in your diarrhea  · Your mouth or eyes are dry  You are not urinating as much or as often  · You have questions or concerns about your condition or care  Medicines:   · Medicines  may be given to stop vomiting or diarrhea, decrease abdominal cramps, or treat an infection  · Take your medicine as directed  Contact your healthcare provider if you think your medicine is not helping or if you have side effects  Tell him or her if you are allergic to any medicine  Keep a list of the medicines, vitamins, and herbs you take  Include the amounts, and when and why you take them  Bring the list or the pill bottles to follow-up visits  Carry your medicine list with you in case of an emergency  Manage your symptoms:   · Drink liquids as directed  Ask your healthcare provider how much liquid to drink each day, and which liquids are best for you  You may also need to drink an oral rehydration solution (ORS)  An ORS has the right amounts of sugar, salt, and minerals in water to replace body fluids  · Eat bland foods  When you feel hungry, begin eating soft, bland foods   Examples are bananas, clear soup, potatoes, and applesauce  Do not have dairy products, alcohol, sugary drinks, or drinks with caffeine until you feel better  · Rest as much as possible  Slowly start to do more each day when you begin to feel better  Prevent the spread of gastroenteritis:  Gastroenteritis can spread easily  Keep yourself, your family, and your surroundings clean to help prevent the spread of gastroenteritis:  · Wash your hands often  Use soap and water  Wash your hands after you use the bathroom, change a child's diapers, or sneeze  Wash your hands before you prepare or eat food  · Clean surfaces and do laundry often  Wash your clothes and towels separately from the rest of the laundry  Clean surfaces in your home with antibacterial  or bleach  · Clean food thoroughly and cook safely  Wash raw vegetables before you cook  Cook meat, fish, and eggs fully  Do not use the same dishes for raw meat as you do for other foods  Refrigerate any leftover food immediately  · Be aware when you camp or travel  Drink only clean water  Do not drink from rivers or lakes unless you purify or boil the water first  When you travel, drink bottled water and do not add ice  Do not eat fruit that has not been peeled  Do not eat raw fish or meat that is not fully cooked  Follow up with your doctor as directed:  Write down your questions so you remember to ask them during your visits  © Copyright [employer] 2022 Information is for End User's use only and may not be sold, redistributed or otherwise used for commercial purposes  All illustrations and images included in CareNotes® are the copyrighted property of A D A M , Inc  or [facility] Kristian rAvizu   The above information is an  only  It is not intended as medical advice for individual conditions or treatments  Talk to your doctor, nurse or pharmacist before following any medical regimen to see if it is safe and effective for you    Influenza   WHAT YOU NEED TO KNOW: Influenza (the flu) is an infection caused by the influenza virus  The flu is easily spread when an infected person coughs, sneezes, or has close contact with others  You may be able to spread the flu to others for 1 week or longer after signs or symptoms appear  DISCHARGE INSTRUCTIONS:   Call your local emergency number (911 in the [address] [facility]) if:   · You have trouble breathing, and your lips look purple or blue  · You have a seizure  Return to the emergency department if:   · You are dizzy, or you are urinating less or not at all  · You have a headache with a stiff neck, and you feel tired or confused  · You have new pain or pressure in your chest     · Your symptoms, such as shortness of breath, vomiting, or diarrhea, get worse  · Your symptoms, such as fever and coughing, seem to get better, but then get worse  Call your doctor if:   · You have new muscle pain or weakness  · You have questions or concerns about your condition or care  Medicines: You may need any of the following:  · Acetaminophen  decreases pain and fever  It is available without a doctor's order  Ask how much to take and how often to take it  Follow directions  Read the labels of all other medicines you are using to see if they also contain acetaminophen, or ask your doctor or pharmacist  Acetaminophen can cause liver damage if not taken correctly  Do not use more than 4 grams (4,000 milligrams) total of acetaminophen in one day  · NSAIDs , such as ibuprofen, help decrease swelling, pain, and fever  This medicine is available with or without a doctor's order  NSAIDs can cause stomach bleeding or kidney problems in certain people  If you take blood thinner medicine, always ask your healthcare provider if NSAIDs are safe for you  Always read the medicine label and follow directions  · Antivirals  help fight a viral infection  · Take your medicine as directed    Contact your healthcare provider if you think your medicine is not helping or if you have side effects  Tell him or her if you are allergic to any medicine  Keep a list of the medicines, vitamins, and herbs you take  Include the amounts, and when and why you take them  Bring the list or the pill bottles to follow-up visits  Carry your medicine list with you in case of an emergency  Rest  as much as you can to help you recover  Drink liquids as directed  to help prevent dehydration  Ask how much liquid to drink each day and which liquids are best for you  Prevent the spread of germs:       · Wash your hands often  Wash your hands several times each day  Wash after you use the bathroom, change a child's diaper, and before you prepare or eat food  Use soap and water every time  Rub your soapy hands together, lacing your fingers  Wash the front and back of your hands, and in between your fingers  Use the fingers of one hand to scrub under the fingernails of the other hand  Wash for at least 20 seconds  Rinse with warm, running water for several seconds  Then dry your hands with a clean towel or paper towel  Use hand  that contains alcohol if soap and water are not available  Do not touch your eyes, nose, or mouth without washing your hands first          · Cover a sneeze or cough  Use a tissue that covers your mouth and nose  Throw the tissue away in a trash can right away  Use the bend of your arm if a tissue is not available  Wash your hands well with soap and water or use a hand   · Stay away from others while you are sick  Avoid crowds as much as possible  · Ask about vaccines you may need  Talk to your healthcare provider about your vaccine history  He or she will tell you which vaccines you need, and when to get them  ? Get the influenza (flu) vaccine as soon as it is available  Flu viruses change, so it is important to get a flu vaccine every year  ? Get the pneumonia vaccine if recommended    This vaccine is usually recommended every 5 years  Your provider will tell you when to get this vaccine, if needed  Follow up with your doctor as directed:  Write down your questions so you remember to ask them during your visits  © Copyright [employer] 2022 Information is for End User's use only and may not be sold, redistributed or otherwise used for commercial purposes  All illustrations and images included in CareNotes® are the copyrighted property of A D A M , Inc  or Daniel Arvizu   The above information is an  only  It is not intended as medical advice for individual conditions or treatments  Talk to your doctor, nurse or pharmacist before following any medical regimen to see if it is safe and effective for you  COVID-19 (Coronavirus Disease 2019)   WHAT YOU NEED TO KNOW:   COVID-19 is the disease caused by a coronavirus first discovered in December 2019  Coronaviruses generally cause upper respiratory (nose, throat, and lung) infections, such as a cold  The 2019 virus spreads quickly and easily  It can be spread starting 2 to 3 days before symptoms even begin  DISCHARGE INSTRUCTIONS:   Call your local emergency number (911 in the [address] [place] [address],[address]) if:   · You have trouble breathing or shortness of breath at rest     · You have chest pain or pressure that lasts longer than 5 minutes  · You become confused or hard to wake  · Your lips or face are blue  Return to the emergency department if:   · You have a fever of 104°F (40°C) or higher  Call your doctor if:   · You have symptoms of COVID-19  · You have questions or concerns about your condition or care  Medicines: You may need any of the following:  · Decongestants  help reduce nasal congestion and help you breathe more easily  If you take decongestant pills, they may make you feel restless or cause problems with your sleep  Do not use decongestant sprays for more than a few days  · Cough suppressants  help reduce coughing   Ask your healthcare provider which type of cough medicine is best for you  · Acetaminophen  decreases pain and fever  It is available without a doctor's order  Ask how much to take and how often to take it  Follow directions  Read the labels of all other medicines you are using to see if they also contain acetaminophen, or ask your doctor or pharmacist  Acetaminophen can cause liver damage if not taken correctly  Do not use more than 4 grams (4,000 milligrams) total of acetaminophen in one day  · NSAIDs , such as ibuprofen, help decrease swelling, pain, and fever  This medicine is available with or without a doctor's order  NSAIDs can cause stomach bleeding or kidney problems in certain people  If you take blood thinner medicine, always ask your healthcare provider if NSAIDs are safe for you  Always read the medicine label and follow directions  · Blood thinners  help prevent blood clots  Clots can cause strokes, heart attacks, and death  The following are general safety guidelines to follow while you are taking a blood thinner:    ? Watch for bleeding and bruising while you take blood thinners  Watch for bleeding from your gums or nose  Watch for blood in your urine and bowel movements  Use a soft washcloth on your skin, and a soft toothbrush to brush your teeth  This can keep your skin and gums from bleeding  If you shave, use an electric shaver  Do not play contact sports  ? Tell your dentist and other healthcare providers that you take a blood thinner  Wear a bracelet or necklace that says you take this medicine  ? Do not start or stop any other medicines unless your healthcare provider tells you to  Many medicines cannot be used with blood thinners  ? Take your blood thinner exactly as prescribed by your healthcare provider  Do not skip does or take less than prescribed  Tell your provider right away if you forget to take your blood thinner, or if you take too much      ? Warfarin  is a blood thinner that you may need to take  The following are things you should be aware of if you take warfarin:     § Foods and medicines can affect the amount of warfarin in your blood  Do not make major changes to your diet while you take warfarin  Warfarin works best when you eat about the same amount of vitamin K every day  Vitamin K is found in green leafy vegetables and certain other foods  Ask for more information about what to eat when you are taking warfarin  § You will need to see your healthcare provider for follow-up visits when you are on warfarin  You will need regular blood tests  These tests are used to decide how much medicine you need  · Take your medicine as directed  Contact your healthcare provider if you think your medicine is not helping or if you have side effects  Tell him or her if you are allergic to any medicine  Keep a list of the medicines, vitamins, and herbs you take  Include the amounts, and when and why you take them  Bring the list or the pill bottles to follow-up visits  Carry your medicine list with you in case of an emergency  What you need to know about variants: The virus has changed into several new forms (called variants) since it was discovered  The variants may be more contagious (easily spread) than the original form  Some may also cause more severe illness than others  What you need to know about COVID-19 vaccines:  Healthcare providers recommend a COVID-19 vaccine, even if you have already had COVID-19  You are considered fully vaccinated against COVID-19 two weeks after the final dose of any COVID-19 vaccine  Let your healthcare provider know when you have received the final dose of the vaccine  Make a copy of your vaccination card  Keep the original with you in case you need to show it  Keep the copy in a safe place  · COVID-19 vaccines are given as a shot in 1 or 2 doses  Vaccination is recommended for everyone 5 years or older   One 2-dose vaccine is fully approved for those 12 or older  This vaccine also has an emergency use authorization (EUA) for children 11to 13years old  No vaccine is currently available for children younger than 5 years  A booster (additional) dose is given to help the immune system continue to protect against severe COVID-19     ? A booster is recommended for all adults 18 or older  The booster can be a different brand of the COVID-19 vaccine than you originally received  The timing for the booster depends on the type of vaccine you received:    § 1-dose vaccine: The booster is given at least 2 months after you received the vaccine  § 2-dose vaccine: The booster is given at least 6 months after the second dose   ? A booster can be given to adolescents 12to 16years old  Only 1 COVID-19 vaccine has an EUA for adolescent boosters  The booster is given at least 6 months after the second dose of the original vaccine series  Continue social distancing and other measures, even after you get the vaccine  Although it is not common, you can become infected after you get the vaccine  You may also be able to pass the virus to others without knowing you are infected  After you get the vaccine, check local, national, and international travel rules  You may need to be tested before you travel  Some countries require proof of a negative test before you travel  You may also need to quarantine after you return  How the 2019 coronavirus spreads:   · Droplets are the main way all coronaviruses spread  The virus travels in droplets that form when a person talks, sings, coughs, or sneezes  The droplets can also float in the air for minutes or hours  Infection happens when you breathe in the droplets or get them in your eyes or nose  Close personal contact with an infected person increases your risk for infection  This means being within 6 feet (2 meters) of the person for at least 15 minutes over 24 hours  · Person-to-person contact can spread the virus  For example, a person with the virus on his or her hands can spread it by shaking hands with someone  · The virus can stay on objects and surfaces for up to 3 days  You may become infected by touching the object or surface and then touching your eyes or mouth  Help lower the risk for COVID-19:   · Wash your hands often throughout the day  Use soap and water  Rub your soapy hands together, lacing your fingers, for at least 20 seconds  Rinse with warm, running water  Dry your hands with a clean towel or paper towel  Use hand  that contains alcohol if soap and water are not available  Teach children how to wash their hands and use hand   · Cover sneezes and coughs  Turn your face away and cover your mouth and nose with a tissue  Throw the tissue away  Use the bend of your arm if a tissue is not available  Then wash your hands well with soap and water or use hand   Teach children how to cover a cough or sneeze  · Wear a face covering (mask) when needed  Use a cloth covering with at least 2 layers  You can also create layers by putting a cloth covering over a disposable non-medical mask  Cover your mouth and your nose  · Follow worldwide, national, and local social distancing guidelines  Keep at least 6 feet (2 meters) between you and others  · Try not to touch your face  If you get the virus on your hands, you can transfer it to your eyes, nose, or mouth and become infected  You can also transfer it to objects, surfaces, or people  · Clean and disinfect high-touch surfaces and objects often  Use disinfecting wipes, or make a solution of 4 teaspoons of bleach in 1 quart (4 cups) of water  · Ask about other vaccines you may need  Get the influenza (flu) vaccine as soon as recommended each year, usually starting in September or October  Get the pneumonia vaccine if recommended   Your healthcare provider can tell you if you should also get other vaccines, and when to get them  Follow social distancing guidelines:  National and local social distancing rules vary  Rules and restrictions may change over time as restrictions are lifted  The following are general guidelines:  · Stay home if you are sick or think you may have COVID-19  It is important to stay home if you are waiting for a testing appointment or for test results  · Avoid close physical contact with anyone who does not live in your home  Do not shake hands with, hug, or kiss a person as a greeting  If you must use public transportation (such as a bus or subway), try to sit or stand away from others  Wear your face covering  · Avoid in-person gatherings and crowds  Attend virtually if possible  Follow up with your doctor as directed:  Write down your questions so you remember to ask them during your visits  For more information:   · Centers for Disease Control and Prevention  [address] , [address]  Phone: 3- 794 - [phone]  Web Address: [employer] com br    © Copyright [employer] 2022 Information is for End User's use only and may not be sold, redistributed or otherwise used for commercial purposes  All illustrations and images included in CareNotes® are the copyrighted property of [employer] , Inc  or [facility] Kristian Arvizu   The above information is an  only  It is not intended as medical advice for individual conditions or treatments  Talk to your doctor, nurse or pharmacist before following any medical regimen to see if it is safe and effective for you

## 2022-04-26 NOTE — LETTER
April 26, 2022     Patient: Diandra Perry   YOB: 1975   Date of Visit: 4/26/2022       To Whom It May Concern: It is my medical opinion that Marcelle Murphy may return to work on 4/28/2022  If you have any questions or concerns, please don't hesitate to call  Sincerely,        [facility]     CC: Sagar Ferrer

## 2022-04-27 LAB
FLUAV RNA RESP QL NAA+PROBE: NEGATIVE
FLUBV RNA RESP QL NAA+PROBE: NEGATIVE
SARS-COV-2 RNA RESP QL NAA+PROBE: NEGATIVE

## 2022-06-06 ENCOUNTER — TELEPHONE (OUTPATIENT)
Dept: FAMILY MEDICINE CLINIC | Facility: CLINIC | Age: 47
End: 2022-06-06

## 2022-06-06 ENCOUNTER — TELEPHONE (OUTPATIENT)
Dept: OBGYN CLINIC | Facility: MEDICAL CENTER | Age: 47
End: 2022-06-06

## 2022-06-06 DIAGNOSIS — Z12.31 ENCOUNTER FOR SCREENING MAMMOGRAM FOR MALIGNANT NEOPLASM OF BREAST: Primary | ICD-10-CM

## 2022-06-06 DIAGNOSIS — Z12.31 SCREENING MAMMOGRAM FOR BREAST CANCER: ICD-10-CM

## 2022-06-06 NOTE — TELEPHONE ENCOUNTER
Pt called in set up yearly appt for July  Pt would like a script for 3D mamogram put in her chart, please review when available thank you

## 2022-06-06 NOTE — TELEPHONE ENCOUNTER
Patient is job hunting and needs a letter stating she had a work Physical   Any questions call  Patient at [phone]

## 2022-07-11 ENCOUNTER — ANNUAL EXAM (OUTPATIENT)
Dept: OBGYN CLINIC | Facility: CLINIC | Age: 47
End: 2022-07-11
Payer: COMMERCIAL

## 2022-07-11 VITALS
HEIGHT: 62 IN | WEIGHT: 140.4 LBS | BODY MASS INDEX: 25.83 KG/M2 | SYSTOLIC BLOOD PRESSURE: 110 MMHG | DIASTOLIC BLOOD PRESSURE: 76 MMHG

## 2022-07-11 DIAGNOSIS — Z12.31 ENCOUNTER FOR SCREENING MAMMOGRAM FOR MALIGNANT NEOPLASM OF BREAST: ICD-10-CM

## 2022-07-11 DIAGNOSIS — Z01.419 WELL FEMALE EXAM WITH ROUTINE GYNECOLOGICAL EXAM: Primary | ICD-10-CM

## 2022-07-11 PROCEDURE — G0145 SCR C/V CYTO,THINLAYER,RESCR: HCPCS | Performed by: ADVANCED PRACTICE MIDWIFE

## 2022-07-11 PROCEDURE — S0612 ANNUAL GYNECOLOGICAL EXAMINA: HCPCS | Performed by: ADVANCED PRACTICE MIDWIFE

## 2022-07-11 NOTE — PROGRESS NOTES
[facility] [address] [place] [address]    ASSESSMENT/PLAN: Steve Adams is a 51 y o   who presents for annual gynecologic exam     Encounter for routine gynecologic examination  - Routine well woman exam completed today  - Cervical Cancer Screening: Current ASCCP Guidelines reviewed  Last Pap: 2021 normal pap smear and HPV  Next Pap Due: pt requesting today  - HPV Vaccination status: Not immunized  - STI screening offered including HIV: not indicated based on hx or requested at time of visit  - Contraceptive counseling discussed  Current contraception: had hysterectomy   - Breast Cancer Screening: Last Mammogram Not on file, script given  - RTO 1 yr    Additional problems addressed at this visit:  1  Well female exam with routine gynecological exam  -     Mammo screening bilateral w 3d & cad; Future; Expected date: 2022  -     Liquid-based pap, screening    2  Encounter for screening mammogram for malignant neoplasm of breast  -     Mammo screening bilateral w 3d & cad; Future; Expected date: 2022          CC:  Annual Gynecologic Examination    HPI: Steve Adams is a 51 y o   who presents for annual gynecologic examination  Olimpia Hernandez presents today for gyn exam  No vaginal bleeding since hysterectomy for pain from IUD and family hx of sarcoma-  last pap smear- normal- pap smear and HPV, Hx of abnormal pap smear yes- , 2005- Leep- in   Sexually active- no  2021 mammogram- Normal , and has to schedule colonoscopy  Reports 4-6 hrs of sleep daily  Exercises no routine per week  3 servings of caffeine daily  Does not perform SBE  Concerns: would like pap smear done yearly          The following portions of the patient's history were reviewed and updated as appropriate: allergies, current medications, past family history, past medical history, obstetric history, gynecologic history, past social history, past surgical history and problem list     Review of Systems   Constitutional: Negative for activity change, appetite change, fatigue and fever  Respiratory: Negative for cough and shortness of breath  Cardiovascular: Negative for chest pain, palpitations and leg swelling  Gastrointestinal: Negative for constipation and diarrhea  Genitourinary: Negative for difficulty urinating, dysuria, frequency, pelvic pain, vaginal bleeding, vaginal discharge and vaginal pain  Neurological: Negative for light-headedness and headaches  Psychiatric/Behavioral: The patient is not nervous/anxious  Objective:  /76   Ht 5' 2" (1 575 m)   Wt 63 7 kg (140 lb 6 4 oz)   LMP  (LMP Unknown)   BMI 25 68 kg/m²    Physical Exam  Vitals reviewed  Constitutional:       Appearance: Normal appearance  HENT:      Head: Normocephalic  Neck:      Thyroid: No thyroid mass or thyroid tenderness  Cardiovascular:      Rate and Rhythm: Normal rate and regular rhythm  Heart sounds: Normal heart sounds  Pulmonary:      Effort: Pulmonary effort is normal       Breath sounds: Normal breath sounds  Chest:   Breasts:      Right: No mass, nipple discharge, skin change, tenderness or axillary adenopathy  Left: No mass, nipple discharge, skin change, tenderness or axillary adenopathy  Abdominal:      General: There is no distension  Palpations: There is no mass  Tenderness: There is no abdominal tenderness  There is no guarding  Genitourinary:     General: Normal vulva  Exam position: Lithotomy position  Labia:         Right: No tenderness or lesion  Left: No tenderness or lesion  Vagina: No vaginal discharge, tenderness, bleeding or lesions  Uterus: Absent  Adnexa:         Right: No mass, tenderness or fullness  Left: No mass, tenderness or fullness  Musculoskeletal:      Cervical back: Normal range of motion     Lymphadenopathy:      Upper Body:      Right upper body: No axillary adenopathy  Left upper body: No axillary adenopathy  Skin:     General: Skin is warm and dry  Neurological:      Mental Status: She is alert     Psychiatric:         Mood and Affect: Mood normal          Behavior: Behavior normal          Judgment: Judgment normal              Bree Muñoz CNM  [facility]  07/11/22 9:03 AM

## 2022-07-15 LAB
LAB AP GYN PRIMARY INTERPRETATION: NORMAL
Lab: NORMAL

## 2022-07-25 ENCOUNTER — OFFICE VISIT (OUTPATIENT)
Dept: FAMILY MEDICINE CLINIC | Facility: CLINIC | Age: 47
End: 2022-07-25
Payer: COMMERCIAL

## 2022-07-25 VITALS
OXYGEN SATURATION: 98 % | WEIGHT: 138 LBS | SYSTOLIC BLOOD PRESSURE: 120 MMHG | HEIGHT: 62 IN | DIASTOLIC BLOOD PRESSURE: 76 MMHG | RESPIRATION RATE: 20 BRPM | BODY MASS INDEX: 25.4 KG/M2 | HEART RATE: 78 BPM | TEMPERATURE: 98.4 F

## 2022-07-25 DIAGNOSIS — F43.0 STRESS REACTION: ICD-10-CM
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
## 2022-07-25 DIAGNOSIS — M43.6 NECK STIFFNESS: ICD-10-CM

## 2022-07-25 PROCEDURE — 99214 OFFICE O/P EST MOD 30 MIN: CPT | Performed by: NURSE PRACTITIONER

## 2022-07-25 RX ORDER — ALPRAZOLAM 0.25 MG/1
0.25 TABLET ORAL 2 TIMES DAILY PRN
Qty: 60 TABLET | Refills: 0 | Status: SHIPPED | OUTPATIENT
Start: 2022-07-25

## 2022-07-25 RX ORDER — CYCLOBENZAPRINE HCL 5 MG
5 TABLET ORAL 3 TIMES DAILY PRN
Qty: 60 TABLET | Refills: 1 | Status: SHIPPED | OUTPATIENT
Start: 2022-07-25

## 2022-07-25 RX ORDER — MELOXICAM 15 MG/1
15 TABLET ORAL DAILY
Qty: 60 TABLET | Refills: 1 | Status: SHIPPED | OUTPATIENT
Start: 2022-07-25

## 2022-07-25 NOTE — PATIENT INSTRUCTIONS
Mobic and cyclobenzaprine as directed as discussed  Xanax 0 25mg 1/2-2 tablets as discussed for anxiety/stress reaction  Highly recommend counseling if symptoms worsen/continue  Call for problems/concerns/questions
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
## 2022-07-27 ENCOUNTER — TELEPHONE (OUTPATIENT)
Dept: FAMILY MEDICINE CLINIC | Facility: CLINIC | Age: 47
End: 2022-07-27
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
## 2022-08-03 ENCOUNTER — APPOINTMENT (OUTPATIENT)
Dept: RADIOLOGY | Facility: CLINIC | Age: 47
End: 2022-08-03
Payer: COMMERCIAL

## 2022-08-03 ENCOUNTER — OFFICE VISIT (OUTPATIENT)
Dept: URGENT CARE | Facility: CLINIC | Age: 47
End: 2022-08-03
Payer: COMMERCIAL

## 2022-08-03 VITALS
WEIGHT: 134 LBS | SYSTOLIC BLOOD PRESSURE: 124 MMHG | DIASTOLIC BLOOD PRESSURE: 72 MMHG | OXYGEN SATURATION: 98 % | HEIGHT: 64 IN | RESPIRATION RATE: 20 BRPM | TEMPERATURE: 98 F | HEART RATE: 78 BPM | BODY MASS INDEX: 22.88 KG/M2

## 2022-08-03 DIAGNOSIS — M79.672 PAIN OF LEFT HEEL: ICD-10-CM

## 2022-08-03 DIAGNOSIS — S90.32XA CONTUSION OF LEFT HEEL, INITIAL ENCOUNTER: Primary | ICD-10-CM

## 2022-08-03 PROCEDURE — G0382 LEV 3 HOSP TYPE B ED VISIT: HCPCS | Performed by: NURSE PRACTITIONER

## 2022-08-03 PROCEDURE — 99283 EMERGENCY DEPT VISIT LOW MDM: CPT | Performed by: NURSE PRACTITIONER

## 2022-08-03 PROCEDURE — 73650 X-RAY EXAM OF HEEL: CPT
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
## 2022-08-03 NOTE — PATIENT INSTRUCTIONS
Your xray was preliminarily read by your provider  A radiologist will read the xray and you will be notified if it is abnormal     Take the mobic that you were prescribed for pain  You should wear a comfortable supportive cushioned shoe (sneaker)  Follow up with podiatry if needed   You are to see your PCP   Go to the ED if symptoms worsen

## 2022-08-05 ENCOUNTER — OFFICE VISIT (OUTPATIENT)
Dept: FAMILY MEDICINE CLINIC | Facility: CLINIC | Age: 47
End: 2022-08-05
Payer: COMMERCIAL

## 2022-08-05 VITALS
OXYGEN SATURATION: 99 % | SYSTOLIC BLOOD PRESSURE: 116 MMHG | HEART RATE: 79 BPM | BODY MASS INDEX: 23.05 KG/M2 | WEIGHT: 135 LBS | HEIGHT: 64 IN | TEMPERATURE: 98.9 F | DIASTOLIC BLOOD PRESSURE: 80 MMHG

## 2022-08-05 DIAGNOSIS — M79.672 PAIN OF LEFT HEEL: ICD-10-CM

## 2022-08-05 DIAGNOSIS — F43.10 PTSD (POST-TRAUMATIC STRESS DISORDER): ICD-10-CM

## 2022-08-05 DIAGNOSIS — F41.9 ANXIETY: Primary | ICD-10-CM

## 2022-08-05 PROCEDURE — 99214 OFFICE O/P EST MOD 30 MIN: CPT | Performed by: FAMILY MEDICINE

## 2022-08-05 RX ORDER — IBUPROFEN 800 MG/1
TABLET ORAL
COMMUNITY
Start: 2022-07-21
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
## 2022-08-08 ENCOUNTER — OFFICE VISIT (OUTPATIENT)
Dept: PODIATRY | Facility: CLINIC | Age: 47
End: 2022-08-08
Payer: COMMERCIAL

## 2022-08-08 VITALS
WEIGHT: 135 LBS | HEIGHT: 64 IN | SYSTOLIC BLOOD PRESSURE: 124 MMHG | HEART RATE: 77 BPM | BODY MASS INDEX: 23.05 KG/M2 | OXYGEN SATURATION: 99 % | DIASTOLIC BLOOD PRESSURE: 64 MMHG

## 2022-08-08 DIAGNOSIS — M79.672 PAIN OF LEFT HEEL: ICD-10-CM

## 2022-08-08 DIAGNOSIS — S90.32XA CONTUSION OF LEFT HEEL, INITIAL ENCOUNTER: ICD-10-CM

## 2022-08-08 PROBLEM — F41.9 ANXIETY: Status: ACTIVE | Noted: 2022-08-08

## 2022-08-08 PROCEDURE — 99203 OFFICE O/P NEW LOW 30 MIN: CPT | Performed by: STUDENT IN AN ORGANIZED HEALTH CARE EDUCATION/TRAINING PROGRAM
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
## 2022-08-22 DIAGNOSIS — Z12.31 ENCOUNTER FOR SCREENING MAMMOGRAM FOR MALIGNANT NEOPLASM OF BREAST: ICD-10-CM

## 2022-08-22 DIAGNOSIS — Z01.419 WELL FEMALE EXAM WITH ROUTINE GYNECOLOGICAL EXAM: ICD-10-CM

## 2022-08-30 ENCOUNTER — OFFICE VISIT (OUTPATIENT)
Dept: PODIATRY | Facility: CLINIC | Age: 47
End: 2022-08-30
Payer: COMMERCIAL

## 2022-08-30 VITALS
SYSTOLIC BLOOD PRESSURE: 118 MMHG | WEIGHT: 135 LBS | HEIGHT: 64 IN | BODY MASS INDEX: 23.05 KG/M2 | HEART RATE: 62 BPM | OXYGEN SATURATION: 97 % | DIASTOLIC BLOOD PRESSURE: 62 MMHG

## 2022-08-30 DIAGNOSIS — B35.1 TINEA UNGUIUM: ICD-10-CM

## 2022-08-30 DIAGNOSIS — S90.32XD CONTUSION OF LEFT HEEL, SUBSEQUENT ENCOUNTER: Primary | ICD-10-CM

## 2022-08-30 PROCEDURE — 99213 OFFICE O/P EST LOW 20 MIN: CPT | Performed by: STUDENT IN AN ORGANIZED HEALTH CARE EDUCATION/TRAINING PROGRAM

## 2022-08-30 NOTE — PROGRESS NOTES
Assessment/Plan:    No problem-specific Assessment & Plan notes found for this encounter  Diagnoses and all orders for this visit:    Contusion of left heel, subsequent encounter    Tinea unguium      Plan:     -  Patient was counseled and educated on the condition and the diagnosis  The diagnosis, treatment options and prognosis were discussed in detail  - patient has significantly improved in her left heel pain with weight-bearing  Recommended patient to transition to sneakers with gel heel cups  She can resume activity as tolerated  - I offered patient various treatment options including topical OTC and prescription anti-fungal topicals and oral anti-fungal medications  I also explained patient risks and benefits of each treatment  Patient opted for over-the-counter topical anti-fungal such as tea tree oil or fungi nail  Patient agrees with plan and understands  All questions and concerns were addressed  - return as needed  - Call if any questions       Subjective:      Patient ID: Samson Lyons is a 51 y o  female  71-year-old female presents to follow up continued left heel pain secondary to contusion  Patient reports she did not wear Cam boot given her job requirements  However she does report improvement in heel pain since the injury  She is able to tolerate sneakers  She complains of a discolored toenail on the bilateral hallux  She denies any other complaints at this time  Denies nausea vomiting fever chills shortness of breath  The following portions of the patient's history were reviewed and updated as appropriate:   She  has a past medical history of Abnormal Pap smear of cervix, Allergic, Arthritis, and HPV (human papilloma virus) infection    She   Patient Active Problem List    Diagnosis Date Noted    Anxiety 08/08/2022    Pain of left heel 08/08/2022    Abdominal cramping 02/16/2022    Fatigue 02/16/2022    Seasonal allergies 10/06/2020    Cyst of left breast 10/06/2020    Family history of breast cancer 10/06/2020    Family history of uterine cancer 10/06/2020    Pelvic pain 2019     She  has a past surgical history that includes Hysterectomy;  section; and Cervical biopsy w/ loop electrode excision       Review of Systems   Skin: Positive for color change  All other systems reviewed and are negative  Objective:      /62 (BP Location: Right arm, Patient Position: Sitting, Cuff Size: Standard)   Pulse 62   Ht 5' 4" (1 626 m)   Wt 61 2 kg (135 lb)   LMP  (LMP Unknown)   SpO2 97%   BMI 23 17 kg/m²          Physical Exam  Vitals reviewed  Cardiovascular:      Rate and Rhythm: Normal rate  Pulses: Normal pulses  Musculoskeletal:         General: No tenderness  Comments: No pain with palpation noted on the left heel  Intact foot and ankle range of motion  Muscle manual test 5/5  Feet:      Right foot:      Toenail Condition: Fungal disease present  Left foot:      Toenail Condition: Fungal disease present  Comments: Yellowish discoloration noted on the bilateral hallux toenail mild subungual debris  Skin:     General: Skin is warm  Neurological:      General: No focal deficit present  Mental Status: She is alert     Psychiatric:         Mood and Affect: Mood normal

## 2022-09-06 ENCOUNTER — OCCMED (OUTPATIENT)
Dept: URGENT CARE | Facility: CLINIC | Age: 47
End: 2022-09-06

## 2022-09-06 DIAGNOSIS — Z02.1 PHYSICAL EXAM, PRE-EMPLOYMENT: Primary | ICD-10-CM

## 2022-09-06 LAB — RUBV IGG SERPL IA-ACNC: 20 IU/ML

## 2022-09-06 PROCEDURE — 86762 RUBELLA ANTIBODY: CPT | Performed by: NURSE PRACTITIONER

## 2022-09-06 PROCEDURE — 86787 VARICELLA-ZOSTER ANTIBODY: CPT | Performed by: NURSE PRACTITIONER

## 2022-09-06 PROCEDURE — 86735 MUMPS ANTIBODY: CPT | Performed by: NURSE PRACTITIONER

## 2022-09-06 PROCEDURE — 86480 TB TEST CELL IMMUN MEASURE: CPT | Performed by: NURSE PRACTITIONER

## 2022-09-06 PROCEDURE — 86765 RUBEOLA ANTIBODY: CPT | Performed by: NURSE PRACTITIONER

## 2022-09-07 LAB
GAMMA INTERFERON BACKGROUND BLD IA-ACNC: 0.02 IU/ML
M TB IFN-G BLD-IMP: NEGATIVE
M TB IFN-G CD4+ BCKGRND COR BLD-ACNC: 0 IU/ML
M TB IFN-G CD4+ BCKGRND COR BLD-ACNC: 0.01 IU/ML
MEV IGG SER QL IA: NORMAL
MITOGEN IGNF BCKGRD COR BLD-ACNC: 9.14 IU/ML
MUV IGG SER QL IA: NORMAL
VZV IGG SER QL IA: NORMAL

## 2022-09-16 DIAGNOSIS — R92.2 DENSE BREAST TISSUE ON MAMMOGRAM: Primary | ICD-10-CM

## 2022-10-03 ENCOUNTER — TELEPHONE (OUTPATIENT)
Dept: FAMILY MEDICINE CLINIC | Facility: CLINIC | Age: 47
End: 2022-10-03
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
## 2022-10-03 NOTE — TELEPHONE ENCOUNTER
Patient dropped off a form for jury duty  She is asking to please be excused  Can we type up something? Okay to wait unitl Dr Reese Hylton back in office

## 2022-11-04 ENCOUNTER — AMB VIDEO VISIT (OUTPATIENT)
Dept: OTHER | Facility: HOSPITAL | Age: 47
End: 2022-11-04

## 2022-11-04 DIAGNOSIS — U07.1 COVID: Primary | ICD-10-CM

## 2022-11-04 NOTE — CARE ANYWHERE EVISITS
Visit Summary for Camelia Corrales - Gender: Female - Date of Birth: [id number]  Date: [id number] - Duration: 15 minutes  Patient: Camelia Antoni  Provider: Nilda Bell PA-C    Patient Contact Information  Address  [address]; [address] [facility]  [phone]    Visit Topics    Triage Questions   What is your current physical address in the event of a medical emergency? Answer []  Are you allergic to any medications? Answer []  Are you now or could you be pregnant? Answer []  Do you have any immune system compromise or chronic lung   disease? Answer []  Do you have any vulnerable family members in the home (infant, pregnant, cancer, elderly)? Answer []     Conversation Transcripts  [0A][0A] [Notification] You are connected with Nilda Bell PA-C, Urgent Care Specialist [0A][Notification] ANGELINA Stapleton is located in [place]  [0A][Notification] Camelia Corrales has shared health history  Shad Hensley  [0A]    Diagnosis  COVID-19    Procedures  Value: [id number] Code: CPT-4 UNLISTED E&M SERVICE    Medications Prescribed    No prescriptions ordered    Electronically signed by: Malena Hussein(NPI [id number])

## 2022-11-04 NOTE — PROGRESS NOTES
Video Visit - Milton Gross 51 y o  female MRN: [id number]    REQUIRED DOCUMENTATION:         1  This service was provided via [facility]  2  Provider located at [address] [place] [id number] [phone]  3  [facility] provider: Benjamin Morgan PA-C   4  Identify all parties in room with patient during [facility] visit:  No one else  5  After connecting through [place], patient was identified by name and date of birth  Patient was then informed that this was a Telemedicine visit and that the exam was being conducted confidentially over secure lines  My office door was closed  No one else was in the room  Patient acknowledged consent and understanding of privacy and security of the Telemedicine visit  I informed the patient that I have reviewed their record in Epic and presented the opportunity for them to ask any questions regarding the visit today  The patient agreed to participate  VITALS: Heart Rate: 96 BPM, Respiratory Rate: 12 RPM, Temperature 99 3° F, Blood Pressure Unavailable mmHg, Pulse Ox Unavailable % on RA    HPI  Pt reports COVID positive 10/26/22  Day 9 of her sx, still feeling unwell, fatigue, stomach "feels yucky," temp 99 4  Doesn't know if she is going to feel well enough to start work on Monday  Was supposed to start 8 hour shifts, but now forced to work 5 12 hour shifts  She thinks she is going to quit  She has been drinking a lot of water, taking tylenol and motrin  Vaccinated x 4  Denies CP or SOB  Physical Exam  Constitutional:       General: She is not in acute distress  Appearance: Normal appearance  She is normal weight  She is ill-appearing (mild, cheeks flushed)  She is not toxic-appearing  HENT:      Head: Normocephalic and atraumatic  Nose: No rhinorrhea  Mouth/Throat:      Mouth: Mucous membranes are moist    Eyes:      Conjunctiva/sclera: Conjunctivae normal    Cardiovascular:      Rate and Rhythm: Normal rate  Pulmonary:      Effort: Pulmonary effort is normal  No respiratory distress  Breath sounds: No wheezing (no gross audible wheeze through computer)  Musculoskeletal:      Cervical back: Normal range of motion  Comments: amb about home without difficulty   Skin:     Findings: No rash (on face or neck)  Neurological:      Mental Status: She is alert  Cranial Nerves: No dysarthria or facial asymmetry  Psychiatric:         Mood and Affect: Mood normal          Behavior: Behavior normal          Diagnoses and all orders for this visit:    COVID      Patient Instructions   Schedule a follow-up appointment with your primary care physician for recheck in 2-3 days  If you cannot see your PCP, you can schedule a follow up appointment at a [address] [facility]   Go to the emergency department if you develop any new or worsening symptoms including chest pain, shortness of breath, or anything else that is concerning     [phone] KRISTIN ([phone])  Schedule or Reschedule Outpatient Testing - Option 2  Billing - Option 3  General Info - Option 4  MyChart Help - Option 5  Comprehensive Spine Program - Option 6   COVID - Option 7

## 2022-11-04 NOTE — Clinical Note
Patient on day 9 of COVID sx, still feeling unwell-generalized malaise  Looking for advice   I advised Vit c, d multi vit and f/u with you

## 2022-12-06 ENCOUNTER — OFFICE VISIT (OUTPATIENT)
Dept: FAMILY MEDICINE CLINIC | Facility: CLINIC | Age: 47
End: 2022-12-06

## 2022-12-06 VITALS
TEMPERATURE: 96.7 F | BODY MASS INDEX: 24.24 KG/M2 | WEIGHT: 142 LBS | OXYGEN SATURATION: 99 % | HEART RATE: 76 BPM | HEIGHT: 64 IN | RESPIRATION RATE: 17 BRPM | DIASTOLIC BLOOD PRESSURE: 64 MMHG | SYSTOLIC BLOOD PRESSURE: 100 MMHG

## 2022-12-06 DIAGNOSIS — M79.672 PAIN OF LEFT HEEL: ICD-10-CM

## 2022-12-06 DIAGNOSIS — F43.10 PTSD (POST-TRAUMATIC STRESS DISORDER): Primary | ICD-10-CM

## 2022-12-06 DIAGNOSIS — F41.9 ANXIETY: ICD-10-CM
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
## 2023-01-27 ENCOUNTER — TELEPHONE (OUTPATIENT)
Dept: PODIATRY | Facility: CLINIC | Age: 48
End: 2023-01-27

## 2023-01-27 NOTE — TELEPHONE ENCOUNTER
Caller: [employer]    Doctor/Office: Dr Orestes Riggins    CB#: [phone]    Escalation: Last office visit/requested records for pt from [facility]/did not get what they needed/[facility] records sent to me-I cannot fax anything to them so please call Noel emery @ [phone] ASAP to resolve this issue  Said all they got were ER records from [facility] office visit notes   Thanks